# Patient Record
Sex: FEMALE | Race: WHITE | NOT HISPANIC OR LATINO | Employment: FULL TIME | URBAN - NONMETROPOLITAN AREA
[De-identification: names, ages, dates, MRNs, and addresses within clinical notes are randomized per-mention and may not be internally consistent; named-entity substitution may affect disease eponyms.]

---

## 2018-02-02 ENCOUNTER — OFFICE VISIT (OUTPATIENT)
Dept: FAMILY MEDICINE CLINIC | Facility: CLINIC | Age: 33
End: 2018-02-02

## 2018-02-02 VITALS
WEIGHT: 226 LBS | SYSTOLIC BLOOD PRESSURE: 130 MMHG | TEMPERATURE: 98.3 F | RESPIRATION RATE: 18 BRPM | BODY MASS INDEX: 36.32 KG/M2 | DIASTOLIC BLOOD PRESSURE: 88 MMHG | OXYGEN SATURATION: 98 % | HEART RATE: 87 BPM | HEIGHT: 66 IN

## 2018-02-02 DIAGNOSIS — Z71.6 TOBACCO ABUSE COUNSELING: ICD-10-CM

## 2018-02-02 DIAGNOSIS — Z72.0 NICOTINE ABUSE: ICD-10-CM

## 2018-02-02 DIAGNOSIS — E66.09 CLASS 2 OBESITY DUE TO EXCESS CALORIES WITHOUT SERIOUS COMORBIDITY WITH BODY MASS INDEX (BMI) OF 36.0 TO 36.9 IN ADULT: ICD-10-CM

## 2018-02-02 DIAGNOSIS — T14.8XXA MUSCLE STRAIN: Primary | ICD-10-CM

## 2018-02-02 PROBLEM — E66.9 OBESITY (BMI 35.0-39.9 WITHOUT COMORBIDITY): Status: ACTIVE | Noted: 2018-02-02

## 2018-02-02 PROBLEM — K21.9 GASTROESOPHAGEAL REFLUX DISEASE WITHOUT ESOPHAGITIS: Status: ACTIVE | Noted: 2018-02-02

## 2018-02-02 PROCEDURE — 99203 OFFICE O/P NEW LOW 30 MIN: CPT | Performed by: NURSE PRACTITIONER

## 2018-02-02 PROCEDURE — 96372 THER/PROPH/DIAG INJ SC/IM: CPT | Performed by: NURSE PRACTITIONER

## 2018-02-02 RX ORDER — OMEPRAZOLE 20 MG/1
CAPSULE, DELAYED RELEASE ORAL
Refills: 3 | Status: ON HOLD | COMMUNITY
Start: 2018-01-04 | End: 2019-04-16

## 2018-02-02 RX ORDER — GUAIFENESIN AND DEXTROMETHORPHAN HYDROBROMIDE 600; 30 MG/1; MG/1
TABLET, EXTENDED RELEASE ORAL
Refills: 0 | Status: ON HOLD | COMMUNITY
Start: 2018-01-29 | End: 2019-04-16

## 2018-02-02 RX ORDER — DEXAMETHASONE SODIUM PHOSPHATE 10 MG/ML
10 INJECTION INTRAMUSCULAR; INTRAVENOUS ONCE
Status: COMPLETED | OUTPATIENT
Start: 2018-02-02 | End: 2018-02-02

## 2018-02-02 RX ORDER — TIZANIDINE HYDROCHLORIDE 4 MG/1
4 CAPSULE, GELATIN COATED ORAL NIGHTLY
Qty: 30 CAPSULE | Refills: 0 | Status: SHIPPED | OUTPATIENT
Start: 2018-02-02 | End: 2018-03-04

## 2018-02-02 RX ORDER — AMOXICILLIN 875 MG/1
TABLET, COATED ORAL
Refills: 0 | Status: ON HOLD | COMMUNITY
Start: 2018-01-29 | End: 2019-04-16

## 2018-02-02 RX ADMIN — DEXAMETHASONE SODIUM PHOSPHATE 10 MG: 10 INJECTION INTRAMUSCULAR; INTRAVENOUS at 10:03

## 2018-02-02 NOTE — PATIENT INSTRUCTIONS
Shoulder Pain  Many things can cause shoulder pain, including:  · An injury to the area.  · Overuse of the shoulder.  · Arthritis.  The source of the pain can be:  · Inflammation.  · An injury to the shoulder joint.  · An injury to a tendon, ligament, or bone.  Follow these instructions at home:  Take these actions to help with your pain:  · Squeeze a soft ball or a foam pad as much as possible. This helps to keep the shoulder from swelling. It also helps to strengthen the arm.  · Take over-the-counter and prescription medicines only as told by your health care provider.  · If directed, apply ice to the area:  ¨ Put ice in a plastic bag.  ¨ Place a towel between your skin and the bag.  ¨ Leave the ice on for 20 minutes, 2-3 times per day. Stop applying ice if it does not help with the pain.  · If you were given a shoulder sling or immobilizer:  ¨ Wear it as told.  ¨ Remove it to shower or bathe.  ¨ Move your arm as little as possible, but keep your hand moving to prevent swelling.  Contact a health care provider if:  · Your pain gets worse.  · Your pain is not relieved with medicines.  · New pain develops in your arm, hand, or fingers.  Get help right away if:  · Your arm, hand, or fingers:  ¨ Tingle.  ¨ Become numb.  ¨ Become swollen.  ¨ Become painful.  ¨ Turn white or blue.  This information is not intended to replace advice given to you by your health care provider. Make sure you discuss any questions you have with your health care provider.  Document Released: 09/27/2006 Document Revised: 08/13/2017 Document Reviewed: 04/11/2016  [employer] Interactive Patient Education © 2017 [employer]  Tennis Elbow Rehab  Ask your health care provider which exercises are safe for you. Do exercises exactly as told by your health care provider and adjust them as directed. It is normal to feel mild stretching, pulling, tightness, or discomfort as you do these exercises, but you should stop right away if you feel sudden pain or  your pain gets worse. Do not begin these exercises until told by your health care provider.  Stretching and range of motion exercises  These exercises warm up your muscles and joints and improve the movement and flexibility of your elbow. These exercises also help to relieve pain, numbness, and tingling.  Exercise A: Wrist extensor stretch  1. Extend your left / right elbow with your fingers pointing down.  2. Gently pull the palm of your left / right hand toward you until you feel a gentle stretch on the top of your forearm.  3. To increase the stretch, push your left / right hand toward the outer edge or pinkie side of your forearm.  4. Hold this position for __________ seconds.  Repeat __________ times. Complete this exercise __________ times a day.  If directed by your health care provider, repeat this stretch except do it with a bent elbow this time.  Exercise B: Wrist flexor stretch  1. Extend your left / right elbow and turn your palm upward.  2. Gently pull your left / right palm and fingertips back so your wrist extends and your fingers point more toward the ground.  3. You should feel a gentle stretch on the inside of your forearm.  4. Hold this position for __________ seconds.  Repeat __________ times. Complete this exercise __________ times a day.  If directed by your health care provider, repeat this stretch except do it with a bent elbow this time.  Strengthening exercises  These exercises build strength and endurance in your elbow. Endurance is the ability to use your muscles for a long time, even after they get tired.  Exercise C: Wrist extensors  1. Sit with your left / right forearm palm-down and fully supported on a table or countertop. Your elbow should be resting below the height of your shoulder.  2. Let your left / right wrist extend over the edge of the surface.  3. Loosely hold a __________ weight or a piece of rubber exercise band or tubing in your left / right hand. Slowly curl your left /  right hand up toward your forearm. If you are using band or tubing, hold the band or tubing in place with your other hand to provide resistance.  4. Hold this position for __________ seconds.  5. Slowly return to the starting position.  Repeat __________ times. Complete this exercise __________ times a day.  Exercise D: Radial deviators  1. Stand with a __________ weight in your left / right hand. Or, sit while holding a rubber exercise band or tubing with your other arm supported on a table or countertop. Position your hand so your thumb is on top.  2. Raise your hand upward in front of you so your thumb travels toward your forearm, or pull up on the rubber tubing.  3. Hold this position for __________ seconds.  4. Slowly return to the starting position.  Repeat __________ times. Complete this exercise __________ times a day.  Exercise E: Eccentric wrist extensors  1. Sit with your left / right forearm palm-down and fully supported on a table or countertop. Your elbow should be resting below the height of your shoulder.  2. If told by your health care provider, hold a __________ weight in your hand.  3. Let your left / right wrist extend over the edge of the surface.  4. Use your other hand to lift up your left / right hand toward your forearm. Keep your forearm on the table.  5. Using only the muscles in your left / right hand, slowly lower your hand back down to the starting position.  Repeat __________ times. Complete this exercise __________ times a day.  This information is not intended to replace advice given to you by your health care provider. Make sure you discuss any questions you have with your health care provider.  Document Released: 12/18/2006 Document Revised: 08/23/2017 Document Reviewed: 09/15/2016  [employer] Interactive Patient Education © 2017 [employer]  Shoulder Exercises  Ask your health care provider which exercises are safe for you. Do exercises exactly as told by your health care provider  and adjust them as directed. It is normal to feel mild stretching, pulling, tightness, or discomfort as you do these exercises, but you should stop right away if you feel sudden pain or your pain gets worse. Do not begin these exercises until told by your health care provider.  RANGE OF MOTION EXERCISES   These exercises warm up your muscles and joints and improve the movement and flexibility of your shoulder. These exercises also help to relieve pain, numbness, and tingling. These exercises involve stretching your injured shoulder directly.  Exercise A: Pendulum   1. Stand near a wall or a surface that you can hold onto for balance.  2. Bend at the waist and let your left / right arm hang straight down. Use your other arm to support you. Keep your back straight and do not lock your knees.  3. Relax your left / right arm and shoulder muscles, and move your hips and your trunk so your left / right arm swings freely. Your arm should swing because of the motion of your body, not because you are using your arm or shoulder muscles.  4. Keep moving your body so your arm swings in the following directions, as told by your health care provider:  ¨ Side to side.  ¨ Forward and backward.  ¨ In clockwise and counterclockwise circles.  5. Continue each motion for __________ seconds, or for as long as told by your health care provider.  6. Slowly return to the starting position.  Repeat __________ times. Complete this exercise __________ times a day.  Exercise B: Flexion, Standing   1. Stand and hold a broomstick, a cane, or a similar object. Place your hands a little more than shoulder-width apart on the object. Your left / right hand should be palm-up, and your other hand should be palm-down.  2. Keep your elbow straight and keep your shoulder muscles relaxed. Push the stick down with your healthy arm to raise your left / right arm in front of your body, and then over your head until you feel a stretch in your  shoulder.  ¨ Avoid shrugging your shoulder while you raise your arm. Keep your shoulder blade tucked down toward the middle of your back.  3. Hold for __________ seconds.  4. Slowly return to the starting position.  Repeat __________ times. Complete this exercise __________ times a day.  Exercise C: Abduction, Standing  1. Stand and hold a broomstick, a cane, or a similar object. Place your hands a little more than shoulder-width apart on the object. Your left / right hand should be palm-up, and your other hand should be palm-down.  2. While keeping your elbow straight and your shoulder muscles relaxed, push the stick across your body toward your left / right side. Raise your left / right arm to the side of your body and then over your head until you feel a stretch in your shoulder.  ¨ Do not raise your arm above shoulder height, unless your health care provider tells you to do that.  ¨ Avoid shrugging your shoulder while you raise your arm. Keep your shoulder blade tucked down toward the middle of your back.  3. Hold for __________ seconds.  4. Slowly return to the starting position.  Repeat __________ times. Complete this exercise __________ times a day.  Exercise D: Internal Rotation   1. Place your left / right hand behind your back, palm-up.  2. Use your other hand to dangle an exercise band, a towel, or a similar object over your shoulder. Grasp the band with your left / right hand so you are holding onto both ends.  3. Gently pull up on the band until you feel a stretch in the front of your left / right shoulder.  ¨ Avoid shrugging your shoulder while you raise your arm. Keep your shoulder blade tucked down toward the middle of your back.  4. Hold for __________ seconds.  5. Release the stretch by letting go of the band and lowering your hands.  Repeat __________ times. Complete this exercise __________ times a day.  STRETCHING EXERCISES   These exercises warm up your muscles and joints and improve the  movement and flexibility of your shoulder. These exercises also help to relieve pain, numbness, and tingling. These exercises are done using your healthy shoulder to help stretch the muscles of your injured shoulder.  Exercise E: Corner Stretch (External Rotation and Abduction)   1.  a doorway with one of your feet slightly in front of the other. This is called a staggered stance. If you cannot reach your forearms to the door frame, stand facing a corner of a room.  2. Choose one of the following positions as told by your health care provider:  ¨ Place your hands and forearms on the door frame above your head.  ¨ Place your hands and forearms on the door frame at the height of your head.  ¨ Place your hands on the door frame at the height of your elbows.  3. Slowly move your weight onto your front foot until you feel a stretch across your chest and in the front of your shoulders. Keep your head and chest upright and keep your abdominal muscles tight.  4. Hold for __________ seconds.  5. To release the stretch, shift your weight to your back foot.  Repeat __________ times. Complete this stretch __________ times a day.  Exercise F: Extension, Standing  1. Stand and hold a broomstick, a cane, or a similar object behind your back.  ¨ Your hands should be a little wider than shoulder-width apart.  ¨ Your palms should face away from your back.  2. Keeping your elbows straight and keeping your shoulder muscles relaxed, move the stick away from your body until you feel a stretch in your shoulder.  ¨ Avoid shrugging your shoulders while you move the stick. Keep your shoulder blade tucked down toward the middle of your back.  3. Hold for __________ seconds.  4. Slowly return to the starting position.  Repeat __________ times. Complete this exercise __________ times a day.  STRENGTHENING EXERCISES   These exercises build strength and endurance in your shoulder. Endurance is the ability to use your muscles for a long  time, even after they get tired.  Exercise G: External Rotation   1. Sit in a stable chair without armrests.  2. Secure an exercise band at elbow height on your left / right side.  3. Place a soft object, such as a folded towel or a small pillow, between your left / right upper arm and your body to move your elbow a few inches away (about 10 cm) from your side.  4. Hold the end of the band so it is tight and there is no slack.  5. Keeping your elbow pressed against the soft object, move your left / right forearm out, away from your abdomen. Keep your body steady so only your forearm moves.  6. Hold for __________ seconds.  7. Slowly return to the starting position.  Repeat __________ times. Complete this exercise __________ times a day.  Exercise H: Shoulder Abduction   1. Sit in a stable chair without armrests, or stand.  2. Hold a __________ weight in your left / right hand, or hold an exercise band with both hands.  3. Start with your arms straight down and your left / right palm facing in, toward your body.  4. Slowly lift your left / right hand out to your side. Do not lift your hand above shoulder height unless your health care provider tells you that this is safe.  ¨ Keep your arms straight.  ¨ Avoid shrugging your shoulder while you do this movement. Keep your shoulder blade tucked down toward the middle of your back.  5. Hold for __________ seconds.  6. Slowly lower your arm, and return to the starting position.  Repeat __________ times. Complete this exercise __________ times a day.  Exercise I: Shoulder Extension  1. Sit in a stable chair without armrests, or stand.  2. Secure an exercise band to a stable object in front of you where it is at shoulder height.  3. Hold one end of the exercise band in each hand. Your palms should face each other.  4. Straighten your elbows and lift your hands up to shoulder height.  5. Step back, away from the secured end of the exercise band, until the band is tight and  "there is no slack.  6. Squeeze your shoulder blades together as you pull your hands down to the sides of your thighs. Stop when your hands are straight down by your sides. Do not let your hands go behind your body.  7. Hold for __________ seconds.  8. Slowly return to the starting position.  Repeat __________ times. Complete this exercise __________ times a day.  Exercise J: Standing Shoulder Row  1. Sit in a stable chair without armrests, or stand.  2. Secure an exercise band to a stable object in front of you so it is at waist height.  3. Hold one end of the exercise band in each hand. Your palms should be in a thumbs-up position.  4. Bend each of your elbows to an \"L\" shape (about 90 degrees) and keep your upper arms at your sides.  5. Step back until the band is tight and there is no slack.  6. Slowly pull your elbows back behind you.  7. Hold for __________ seconds.  8. Slowly return to the starting position.  Repeat __________ times. Complete this exercise __________ times a day.  Exercise K: Shoulder Press-Ups   1. Sit in a stable chair that has armrests. Sit upright, with your feet flat on the floor.  2. Put your hands on the armrests so your elbows are bent and your fingers are pointing forward. Your hands should be about even with the sides of your body.  3. Push down on the armrests and use your arms to lift yourself off of the chair. Straighten your elbows and lift yourself up as much as you comfortably can.  ¨ Move your shoulder blades down, and avoid letting your shoulders move up toward your ears.  ¨ Keep your feet on the ground. As you get stronger, your feet should support less of your body weight as you lift yourself up.  4. Hold for __________ seconds.  5. Slowly lower yourself back into the chair.  Repeat __________ times. Complete this exercise __________ times a day.  Exercise L: Wall Push-Ups   1. Stand so you are facing a stable wall. Your feet should be about one arm-length away from the " wall.  2. Lean forward and place your palms on the wall at shoulder height.  3. Keep your feet flat on the floor as you bend your elbows and lean forward toward the wall.  4. Hold for __________ seconds.  5. Straighten your elbows to push yourself back to the starting position.  Repeat __________ times. Complete this exercise __________ times a day.  This information is not intended to replace advice given to you by your health care provider. Make sure you discuss any questions you have with your health care provider.  Document Released: 11/01/2006 Document Revised: 09/11/2017 Document Reviewed: 08/28/2016  [employer] Interactive Patient Education © 2017 [employer]

## 2018-02-02 NOTE — PROGRESS NOTES
Chief Complaint   Patient presents with   • Back Pain     Patient is here today for upper back and left shoulder pain. Patient is not sure what is causing the pain. Symptoms started 7 days ago.      HISTORY/ HPI:  Kalyn Tinsley is a 32 y.o.  female who presents today for an acute complaint of upper back and left scapula pain, onset approximately 7 days ago; describes discomfort as a constant dull ache with intermittent harp sensation that radiates down to elbow; has used Ibuprofen only without relief; coughing and moving arm worsens discomfort; symptoms are worse in the morning and in the evening and lessens throughout the day; denies injury; rates severity of symptoms 4 /10; reports a recent upper respiratory illness that is resolving with use of amoxicillin and mucinex DX; has a medical history that consist of GERD, stable with intermittent use of prilosec; uncontrolled obesity, BMI today 36.5; and nicotine/ tobacco abuse, currently using an electronic cigarette; reports allergy to ceclor; denies use of ETOH or illicit drugs; no additional concerns at this time.    Kalyn Tinsley  has a past medical history of GERD (gastroesophageal reflux disease) and Substance abuse.    Allergies   Allergen Reactions   • Ceclor [Cefaclor]      Hives       Current Outpatient Prescriptions:   •  amoxicillin (AMOXIL) 875 MG tablet, TAKE 1 TABLET TWICE A DAY, Disp: , Rfl: 0  •  guaifenesin-dextromethorphan (MUCINEX DM)  MG tablet sustained-release 12 hour tablet, TAKE 1 TABLET BY MOUTH TWICE A DAY, Disp: , Rfl: 0  •  omeprazole (priLOSEC) 20 MG capsule, TAKE 1 CAPSULE DAILY, Disp: , Rfl: 3  Past Medical History:   Diagnosis Date   • GERD (gastroesophageal reflux disease)    • Substance abuse      Past Surgical History:   Procedure Laterality Date   • ANKLE SURGERY     • EAR TUBES     • HERNIA REPAIR     • INNER EAR SURGERY     • TUBAL ABDOMINAL LIGATION       Social History     Social History   • Marital status:       Spouse name: N/A   • Number of children: N/A   • Years of education: N/A     Social History Main Topics   • Smoking status: Current Some Day Smoker     Years: 15.00     Types: Electronic Cigarette   • Smokeless tobacco: Never Used      Comment: vape   • Alcohol use No   • Drug use: No   • Sexual activity: Yes     Partners: Male     Birth control/ protection: None     Other Topics Concern   • None     Social History Narrative   • None     Family History   Problem Relation Age of Onset   • No Known Problems Mother    • Hyperlipidemia Father    • No Known Problems Sister    • No Known Problems Brother    • No Known Problems Daughter    • No Known Problems Paternal Grandmother    • Heart attack Paternal Grandfather    • No Known Problems Sister      Family history, surgical history, past medical history, Allergies and med's reviewed with patient today and updated in [employer] EMR.     ROS:  Review of Systems   Constitutional: Negative for activity change, appetite change, chills, diaphoresis, fatigue, fever and unexpected weight change.   HENT: Positive for congestion and postnasal drip. Negative for ear discharge, ear pain, hearing loss, rhinorrhea, sinus pain, sinus pressure, sneezing, sore throat, tinnitus, trouble swallowing and voice change.    Eyes: Negative for pain, discharge, redness, itching and visual disturbance.   Respiratory: Positive for cough (intermittent and non-purulent). Negative for chest tightness, shortness of breath and wheezing.    Cardiovascular: Negative for chest pain, palpitations and leg swelling.   Gastrointestinal: Negative for abdominal distention, abdominal pain, blood in stool, constipation, diarrhea, nausea and vomiting.   Endocrine: Negative for cold intolerance, heat intolerance, polydipsia, polyphagia and polyuria.   Genitourinary: Negative for difficulty urinating.   Musculoskeletal: Positive for back pain (left upper back/ left scapula discomfort ). Negative for arthralgias, joint  "swelling, neck pain and neck stiffness.   Skin: Negative for color change, pallor, rash and wound.   Allergic/Immunologic: Positive for environmental allergies.   Neurological: Positive for weakness (left hand). Negative for dizziness, tremors, syncope, light-headedness, numbness and headaches.   Hematological: Negative.    Psychiatric/Behavioral: Negative.    All other systems reviewed and are negative.    OBJECTIVE:  Vitals:    02/02/18 0914 02/02/18 0928   BP: 133/93 130/88   BP Location: Right arm    Patient Position: Sitting    Cuff Size: Large Adult    Pulse: 87    Resp: 18    Temp: 98.3 °F (36.8 °C)    TempSrc: Oral    SpO2: 98%    Weight: 103 kg (226 lb)    Height: 167.6 cm (66\")      Physical Exam   Constitutional: She is oriented to person, place, and time. She appears well-developed and well-nourished. She is cooperative.  Non-toxic appearance. She does not have a sickly appearance. She does not appear ill. No distress.   Weight today 226 LBS; BMI 36.5.   HENT:   Head: Normocephalic.   Right Ear: Hearing normal.   Left Ear: Hearing normal.   Nose: Nose normal.   Mouth/Throat: Uvula is midline and mucous membranes are normal. No oral lesions. No uvula swelling. Posterior oropharyngeal erythema (mild) present. No oropharyngeal exudate, posterior oropharyngeal edema or tonsillar abscesses. Tonsils are 0 on the right. Tonsils are 0 on the left. No tonsillar exudate.   Eyes: Conjunctivae, EOM and lids are normal. Pupils are equal, round, and reactive to light. Right eye exhibits no discharge and no exudate. No foreign body present in the right eye. Left eye exhibits no discharge and no exudate. No foreign body present in the left eye. Right conjunctiva is not injected. Right conjunctiva has no hemorrhage. Left conjunctiva is not injected. Left conjunctiva has no hemorrhage. No scleral icterus. Right eye exhibits no nystagmus. Left eye exhibits no nystagmus.   Neck: Trachea normal and full passive range of " motion without pain. Neck supple. No tracheal tenderness, no spinous process tenderness and no muscular tenderness present. No rigidity. Normal range of motion present. No Brudzinski's sign noted. No thyroid mass and no thyromegaly present.   Cardiovascular: Normal rate, regular rhythm, normal heart sounds and normal pulses.  Exam reveals no gallop and no friction rub.    No murmur heard.  Pulmonary/Chest: Effort normal and breath sounds normal. No respiratory distress. She has no decreased breath sounds. She has no wheezes. She has no rhonchi. She has no rales. She exhibits no tenderness.   Musculoskeletal:   Left shoulder appears normal with no atrpohy; no bruising; no crepitus; tenderness over scapula; ROM:minimally limited; drop arm rotator cuff test: negative; empty can supraspinatus test negative cross body adduction test:negative; external rotation/infraspinatus test : negative; biceps tendon rupture: absent; distal neuromuscular exam negative sensory exam normal; motor exam normal; radial pulse intact.   Lymphadenopathy:     She has no cervical adenopathy.   Neurological: She is alert and oriented to person, place, and time.   Skin: Skin is warm, dry and intact. No rash noted. No cyanosis. Nails show no clubbing.   Psychiatric: She has a normal mood and affect. Her speech is normal and behavior is normal. Thought content normal.   Nursing note and vitals reviewed.    ASSESSMENT/ PLAN:  Kalyn was seen today for back pain.    Diagnoses and all orders for this visit:    Muscle strain  -     dexamethasone (DECADRON) injection 10 mg; Inject 1 mL into the shoulder, thigh, or buttocks 1 (One) Time.  -     TiZANidine (ZANAFLEX) 4 MG capsule; Take 1 capsule by mouth Every Night for 30 days.  -     diclofenac (VOLTAREN) 1 % gel gel; Apply 4 g topically 3 (Three) Times a Day.    Nicotine abuse    Tobacco abuse counseling    Class 2 obesity due to excess calories without serious comorbidity with body mass index (BMI) of  36.0 to 36.9 in adult    Orders Placed Today:   New Medications Ordered This Visit   Medications   • dexamethasone (DECADRON) injection 10 mg   • TiZANidine (ZANAFLEX) 4 MG capsule     Sig: Take 1 capsule by mouth Every Night for 30 days.     Dispense:  30 capsule     Refill:  0   • diclofenac (VOLTAREN) 1 % gel gel     Sig: Apply 4 g topically 3 (Three) Times a Day.     Dispense:  100 g     Refill:  0      Management Plan:     # 1. Muscle strain  Suspect acute muscle strain. Prognosis is usually good.  Poor mechanics, poor core strength are often involved.  Healthy weight discussed as means to improve muscle strains. There is no benefit to bed rest as was previously thought.  Caution with GI bleed with NSAIDS and GC. Discussed stretches/exercise.  ?Benefit to chiropractic care, manipulation. Topical agents Biofreeze, Icy Hot, Bengaye etc. can be of some benefit as well.     RECOMMENDATIONS  · Acetaminophen (Tylenol). Follow package insert. Discussed risks/benefits of acetaminophen.  Do not take more than 2000 mg Tylenol per day. Discussed recent changes by FDA for risks of MI.  Caution advised with combination medications. Watch for excess Tylenol (acetaminophen) and is present in numerous over the counter combination medications.  Also, be aware of ingredients as these can be duplicated in combination medications if taking various types together.  If questions check with pharmacist or call.   · NSAIDS (Ibuprofen/Aleve/Diclofenac/Mobic) follow package insert. NSAIDs work by blocking natural substances that cause inflammation.   Discussed risks benefits of Ibuprofen/Aleve/Diclofenac/Mobic for pain. Reviewed recent FDA changes regarding increased risks for MI. The patient is aware of risks.  GI Prophylaxis discussed in relation to use of steroids and or NSAIDS.  Discussed NSAIDS may rarely increase risk for MI/stroke, which may be greater if heart disease is present, tobacco use or diabetic for example.  Rx may  increase risks of GI bleed, platelet dysfunction that can occur at any time. May increase risks of kidney damage/disease.  Should not use before or after CABG or major surgery without direction. Side effects can include dizziness, drowsiness, HA upset stomach to name a few.  If any severe symptoms develop please call or f/u in clinic.  · Caution advised with combination medications. Watch for excess Tylenol as this is acetaminophen and is present in numerous over the counter combination medications.  Also be aware of ingredients as these can be duplicated in combination medications if taking various types together.  If questions check with pharmacist or call.  · DECADRON, dexamethasone, methylprednisolone- Pt notified of potential pros/risks of steroid treatment including rapid improvement of condition; allergic reaction, psychologic reaction (depression, anxiety & insomnia), hyperglycemia, skin change at injection site (color, dimpling), muscle weakness.  Pt is aware they may refuse treatment.  · I have advised the patient to take Tizanidine as prescribed.  Avoid driving while taking this medication due to risk for sedation.  · Information about stretching and shoulder exercised provided in AVS.     # 2.  Nicotine/ tobacco abuse/ counseling  Patient was counseled on and understood the many dangers of continuing to use tobacco. Despite this, Ms. Tinsley states quitting is not an immediate priority at this time. I reminded the patient that if quitting becomes an increased priority to contact us for help with quitting including pharmacologic & nonpharmacologic options or any additional resources.  3 minutes were spent in this counseling.    # 3.  Obesity (BMI 36.5)  The patient is ill today, we will continue to educate the patient on the topics of diet and exercise with the goal of weight loss and preventative illness with each scheduled appointment.     HEALTH PROMOTION/ PREVENTION  I discussed and encouraged age  appropriate health promotion/ prevention screenings and immunizations specific to this client: Tdap, pneumococcal, and pap smear; the patient declines these recommendations at this time.     Risks/benefits of current and new medications discussed with the patient and or family today.  The patient/family are aware and accept that if there any side effects they should call or return to clinic as soon as possible.  Appropriate F/U discussed for topics addressed today. All questions were answered to the satisfactory state of patient/family.  Should symptoms fail to improve or worsen they agree to call or return to clinic or to go to the ER. Education handouts were offered on any new Rx if requested.  Discussed the importance of following up with any needed screening tests/labs/specialist appointments and any requested follow-up recommended by me today.  Importance of maintaining follow-up discussed and patient accepts that missed appointments can delay diagnosis and potentially lead to worsening of conditions.    An After Visit Summary was printed and given to the patient at discharge.    Follow-up: Return in about 1 month (around 3/2/2018), or if symptoms worsen or fail to improve, for Recheck.    Jarad Freeman, APRN 2/2/2018 9:35 AM  This note was electronically signed.

## 2018-03-02 DIAGNOSIS — T14.8XXA MUSCLE STRAIN: ICD-10-CM

## 2018-03-02 RX ORDER — TIZANIDINE 4 MG/1
TABLET ORAL
Qty: 30 TABLET | Refills: 0 | OUTPATIENT
Start: 2018-03-02

## 2018-03-02 NOTE — TELEPHONE ENCOUNTER
Please advised the patient that if her pain has continued, despite use of current medications, that she needs to be reassessed before any additional medications can be provided.

## 2019-04-14 ENCOUNTER — APPOINTMENT (OUTPATIENT)
Dept: CT IMAGING | Facility: HOSPITAL | Age: 34
End: 2019-04-14

## 2019-04-14 ENCOUNTER — HOSPITAL ENCOUNTER (INPATIENT)
Facility: HOSPITAL | Age: 34
LOS: 2 days | Discharge: HOME | End: 2019-04-17
Attending: EMERGENCY MEDICINE | Admitting: SPECIALIST

## 2019-04-14 ENCOUNTER — APPOINTMENT (OUTPATIENT)
Dept: GENERAL RADIOLOGY | Facility: HOSPITAL | Age: 34
End: 2019-04-14

## 2019-04-14 DIAGNOSIS — K56.609 SBO (SMALL BOWEL OBSTRUCTION) (HCC): Primary | ICD-10-CM

## 2019-04-14 LAB
ALBUMIN SERPL-MCNC: 4.5 G/DL (ref 3.5–5)
ALBUMIN/GLOB SERPL: 1.6 G/DL (ref 1.1–2.5)
ALP SERPL-CCNC: 85 U/L (ref 24–120)
ALT SERPL W P-5'-P-CCNC: 27 U/L (ref 0–54)
AMYLASE SERPL-CCNC: 63 U/L (ref 30–110)
ANION GAP SERPL CALCULATED.3IONS-SCNC: 9 MMOL/L (ref 4–13)
AST SERPL-CCNC: 20 U/L (ref 7–45)
BASOPHILS # BLD AUTO: 0.03 10*3/MM3 (ref 0–0.2)
BASOPHILS NFR BLD AUTO: 0.2 % (ref 0–2)
BILIRUB SERPL-MCNC: 0.7 MG/DL (ref 0.1–1)
BUN BLD-MCNC: 13 MG/DL (ref 5–21)
BUN/CREAT SERPL: 19.1 (ref 7–25)
CALCIUM SPEC-SCNC: 9.6 MG/DL (ref 8.4–10.4)
CHLORIDE SERPL-SCNC: 105 MMOL/L (ref 98–110)
CO2 SERPL-SCNC: 24 MMOL/L (ref 24–31)
CREAT BLD-MCNC: 0.68 MG/DL (ref 0.5–1.4)
DEPRECATED RDW RBC AUTO: 42.5 FL (ref 40–54)
EOSINOPHIL # BLD AUTO: 0.07 10*3/MM3 (ref 0–0.7)
EOSINOPHIL NFR BLD AUTO: 0.4 % (ref 0–4)
ERYTHROCYTE [DISTWIDTH] IN BLOOD BY AUTOMATED COUNT: 13.1 % (ref 12–15)
GFR SERPL CREATININE-BSD FRML MDRD: 99 ML/MIN/1.73
GLOBULIN UR ELPH-MCNC: 2.8 GM/DL
GLUCOSE BLD-MCNC: 134 MG/DL (ref 70–100)
HCG SERPL QL: NEGATIVE
HCT VFR BLD AUTO: 46.1 % (ref 37–47)
HGB BLD-MCNC: 16.3 G/DL (ref 12–16)
IMM GRANULOCYTES # BLD AUTO: 0.07 10*3/MM3 (ref 0–0.05)
IMM GRANULOCYTES NFR BLD AUTO: 0.4 % (ref 0–5)
LIPASE SERPL-CCNC: 49 U/L (ref 23–203)
LYMPHOCYTES # BLD AUTO: 1.17 10*3/MM3 (ref 0.72–4.86)
LYMPHOCYTES NFR BLD AUTO: 7.5 % (ref 15–45)
MCH RBC QN AUTO: 31.3 PG (ref 28–32)
MCHC RBC AUTO-ENTMCNC: 35.4 G/DL (ref 33–36)
MCV RBC AUTO: 88.7 FL (ref 82–98)
MONOCYTES # BLD AUTO: 0.56 10*3/MM3 (ref 0.19–1.3)
MONOCYTES NFR BLD AUTO: 3.6 % (ref 4–12)
NEUTROPHILS # BLD AUTO: 13.74 10*3/MM3 (ref 1.87–8.4)
NEUTROPHILS NFR BLD AUTO: 87.9 % (ref 39–78)
NRBC BLD AUTO-RTO: 0 /100 WBC (ref 0–0)
PLATELET # BLD AUTO: 305 10*3/MM3 (ref 130–400)
PMV BLD AUTO: 10.3 FL (ref 6–12)
POTASSIUM BLD-SCNC: 4 MMOL/L (ref 3.5–5.3)
PROT SERPL-MCNC: 7.3 G/DL (ref 6.3–8.7)
RBC # BLD AUTO: 5.2 10*6/MM3 (ref 4.2–5.4)
SODIUM BLD-SCNC: 138 MMOL/L (ref 135–145)
WBC NRBC COR # BLD: 15.64 10*3/MM3 (ref 4.8–10.8)

## 2019-04-14 PROCEDURE — 25010000002 IOPAMIDOL 61 % SOLUTION: Performed by: EMERGENCY MEDICINE

## 2019-04-14 PROCEDURE — 93005 ELECTROCARDIOGRAM TRACING: CPT

## 2019-04-14 PROCEDURE — 25010000002 ONDANSETRON PER 1 MG: Performed by: EMERGENCY MEDICINE

## 2019-04-14 PROCEDURE — 85025 COMPLETE CBC W/AUTO DIFF WBC: CPT | Performed by: EMERGENCY MEDICINE

## 2019-04-14 PROCEDURE — 80053 COMPREHEN METABOLIC PANEL: CPT | Performed by: EMERGENCY MEDICINE

## 2019-04-14 PROCEDURE — 84703 CHORIONIC GONADOTROPIN ASSAY: CPT | Performed by: EMERGENCY MEDICINE

## 2019-04-14 PROCEDURE — 74177 CT ABD & PELVIS W/CONTRAST: CPT

## 2019-04-14 PROCEDURE — 99284 EMERGENCY DEPT VISIT MOD MDM: CPT

## 2019-04-14 PROCEDURE — 93005 ELECTROCARDIOGRAM TRACING: CPT | Performed by: EMERGENCY MEDICINE

## 2019-04-14 PROCEDURE — 82150 ASSAY OF AMYLASE: CPT | Performed by: EMERGENCY MEDICINE

## 2019-04-14 PROCEDURE — 83690 ASSAY OF LIPASE: CPT | Performed by: EMERGENCY MEDICINE

## 2019-04-14 PROCEDURE — 71045 X-RAY EXAM CHEST 1 VIEW: CPT

## 2019-04-14 PROCEDURE — 93010 ELECTROCARDIOGRAM REPORT: CPT | Performed by: INTERNAL MEDICINE

## 2019-04-14 PROCEDURE — 25010000002 MORPHINE PER 10 MG: Performed by: EMERGENCY MEDICINE

## 2019-04-14 RX ORDER — ONDANSETRON 2 MG/ML
4 INJECTION INTRAMUSCULAR; INTRAVENOUS ONCE
Status: COMPLETED | OUTPATIENT
Start: 2019-04-14 | End: 2019-04-14

## 2019-04-14 RX ORDER — SODIUM CHLORIDE 0.9 % (FLUSH) 0.9 %
10 SYRINGE (ML) INJECTION AS NEEDED
Status: DISCONTINUED | OUTPATIENT
Start: 2019-04-14 | End: 2019-04-17 | Stop reason: HOSPADM

## 2019-04-14 RX ADMIN — ONDANSETRON HYDROCHLORIDE 4 MG: 2 INJECTION, SOLUTION INTRAMUSCULAR; INTRAVENOUS at 22:22

## 2019-04-14 RX ADMIN — SODIUM CHLORIDE 1000 ML: 9 INJECTION, SOLUTION INTRAVENOUS at 22:23

## 2019-04-14 RX ADMIN — IOPAMIDOL 100 ML: 612 INJECTION, SOLUTION INTRAVENOUS at 23:38

## 2019-04-14 RX ADMIN — MORPHINE SULFATE 4 MG: 4 INJECTION INTRAVENOUS at 22:22

## 2019-04-15 PROBLEM — K56.609 SBO (SMALL BOWEL OBSTRUCTION) (HCC): Status: ACTIVE | Noted: 2019-04-15

## 2019-04-15 LAB
BILIRUB UR QL STRIP: NEGATIVE
CLARITY UR: CLEAR
COLOR UR: YELLOW
DEPRECATED RDW RBC AUTO: 44.5 FL (ref 40–54)
ERYTHROCYTE [DISTWIDTH] IN BLOOD BY AUTOMATED COUNT: 13.2 % (ref 12–15)
GLUCOSE UR STRIP-MCNC: NEGATIVE MG/DL
HCT VFR BLD AUTO: 45.9 % (ref 37–47)
HGB BLD-MCNC: 15.6 G/DL (ref 12–16)
HGB UR QL STRIP.AUTO: NEGATIVE
KETONES UR QL STRIP: NEGATIVE
LEUKOCYTE ESTERASE UR QL STRIP.AUTO: NEGATIVE
MCH RBC QN AUTO: 31.2 PG (ref 28–32)
MCHC RBC AUTO-ENTMCNC: 34 G/DL (ref 33–36)
MCV RBC AUTO: 91.8 FL (ref 82–98)
NITRITE UR QL STRIP: NEGATIVE
PH UR STRIP.AUTO: 6 [PH] (ref 5–8)
PLATELET # BLD AUTO: 235 10*3/MM3 (ref 130–400)
PMV BLD AUTO: 10 FL (ref 6–12)
PROT UR QL STRIP: NEGATIVE
RBC # BLD AUTO: 5 10*6/MM3 (ref 4.2–5.4)
SP GR UR STRIP: >1.03 (ref 1–1.03)
UROBILINOGEN UR QL STRIP: ABNORMAL
WBC NRBC COR # BLD: 21.06 10*3/MM3 (ref 4.8–10.8)

## 2019-04-15 PROCEDURE — 25010000002 MORPHINE SULFATE (PF) 2 MG/ML SOLUTION: Performed by: SPECIALIST

## 2019-04-15 PROCEDURE — 25010000002 MORPHINE PER 10 MG: Performed by: EMERGENCY MEDICINE

## 2019-04-15 PROCEDURE — 36415 COLL VENOUS BLD VENIPUNCTURE: CPT | Performed by: SPECIALIST

## 2019-04-15 PROCEDURE — 25010000002 ONDANSETRON PER 1 MG: Performed by: SPECIALIST

## 2019-04-15 PROCEDURE — 85027 COMPLETE CBC AUTOMATED: CPT | Performed by: SPECIALIST

## 2019-04-15 PROCEDURE — 81003 URINALYSIS AUTO W/O SCOPE: CPT | Performed by: EMERGENCY MEDICINE

## 2019-04-15 PROCEDURE — 25010000002 ONDANSETRON PER 1 MG: Performed by: EMERGENCY MEDICINE

## 2019-04-15 RX ORDER — ONDANSETRON 2 MG/ML
4 INJECTION INTRAMUSCULAR; INTRAVENOUS ONCE
Status: COMPLETED | OUTPATIENT
Start: 2019-04-15 | End: 2019-04-15

## 2019-04-15 RX ORDER — SODIUM CHLORIDE, SODIUM LACTATE, POTASSIUM CHLORIDE, CALCIUM CHLORIDE 600; 310; 30; 20 MG/100ML; MG/100ML; MG/100ML; MG/100ML
125 INJECTION, SOLUTION INTRAVENOUS CONTINUOUS
Status: DISCONTINUED | OUTPATIENT
Start: 2019-04-15 | End: 2019-04-17 | Stop reason: HOSPADM

## 2019-04-15 RX ORDER — MORPHINE SULFATE 2 MG/ML
6 INJECTION, SOLUTION INTRAMUSCULAR; INTRAVENOUS EVERY 4 HOURS PRN
Status: DISCONTINUED | OUTPATIENT
Start: 2019-04-15 | End: 2019-04-17 | Stop reason: HOSPADM

## 2019-04-15 RX ORDER — NALOXONE HCL 0.4 MG/ML
0.4 VIAL (ML) INJECTION
Status: DISCONTINUED | OUTPATIENT
Start: 2019-04-15 | End: 2019-04-17 | Stop reason: HOSPADM

## 2019-04-15 RX ORDER — SODIUM CHLORIDE 9 MG/ML
250 INJECTION, SOLUTION INTRAVENOUS CONTINUOUS
Status: DISCONTINUED | OUTPATIENT
Start: 2019-04-15 | End: 2019-04-15

## 2019-04-15 RX ORDER — ONDANSETRON 2 MG/ML
4 INJECTION INTRAMUSCULAR; INTRAVENOUS EVERY 6 HOURS PRN
Status: DISCONTINUED | OUTPATIENT
Start: 2019-04-15 | End: 2019-04-17 | Stop reason: HOSPADM

## 2019-04-15 RX ORDER — MORPHINE SULFATE 2 MG/ML
6 INJECTION, SOLUTION INTRAMUSCULAR; INTRAVENOUS ONCE
Status: COMPLETED | OUTPATIENT
Start: 2019-04-15 | End: 2019-04-15

## 2019-04-15 RX ADMIN — MORPHINE SULFATE 6 MG: 2 INJECTION, SOLUTION INTRAMUSCULAR; INTRAVENOUS at 23:14

## 2019-04-15 RX ADMIN — ONDANSETRON HYDROCHLORIDE 4 MG: 2 INJECTION, SOLUTION INTRAMUSCULAR; INTRAVENOUS at 09:59

## 2019-04-15 RX ADMIN — SODIUM CHLORIDE, POTASSIUM CHLORIDE, SODIUM LACTATE AND CALCIUM CHLORIDE 125 ML/HR: 600; 310; 30; 20 INJECTION, SOLUTION INTRAVENOUS at 18:45

## 2019-04-15 RX ADMIN — ONDANSETRON HYDROCHLORIDE 4 MG: 2 INJECTION, SOLUTION INTRAMUSCULAR; INTRAVENOUS at 01:17

## 2019-04-15 RX ADMIN — ONDANSETRON HYDROCHLORIDE 4 MG: 2 INJECTION, SOLUTION INTRAMUSCULAR; INTRAVENOUS at 00:47

## 2019-04-15 RX ADMIN — SODIUM CHLORIDE 250 ML/HR: 9 INJECTION, SOLUTION INTRAVENOUS at 01:17

## 2019-04-15 RX ADMIN — MORPHINE SULFATE 6 MG: 2 INJECTION, SOLUTION INTRAMUSCULAR; INTRAVENOUS at 09:59

## 2019-04-15 RX ADMIN — MORPHINE SULFATE 6 MG: 2 INJECTION, SOLUTION INTRAMUSCULAR; INTRAVENOUS at 02:49

## 2019-04-15 RX ADMIN — MORPHINE SULFATE 6 MG: 2 INJECTION, SOLUTION INTRAMUSCULAR; INTRAVENOUS at 00:47

## 2019-04-15 RX ADMIN — ONDANSETRON HYDROCHLORIDE 4 MG: 2 INJECTION, SOLUTION INTRAMUSCULAR; INTRAVENOUS at 18:45

## 2019-04-15 RX ADMIN — MORPHINE SULFATE 6 MG: 2 INJECTION, SOLUTION INTRAMUSCULAR; INTRAVENOUS at 06:29

## 2019-04-15 RX ADMIN — MORPHINE SULFATE 6 MG: 2 INJECTION, SOLUTION INTRAMUSCULAR; INTRAVENOUS at 14:04

## 2019-04-15 RX ADMIN — SODIUM CHLORIDE, POTASSIUM CHLORIDE, SODIUM LACTATE AND CALCIUM CHLORIDE 125 ML/HR: 600; 310; 30; 20 INJECTION, SOLUTION INTRAVENOUS at 10:55

## 2019-04-15 RX ADMIN — SODIUM CHLORIDE, POTASSIUM CHLORIDE, SODIUM LACTATE AND CALCIUM CHLORIDE 125 ML/HR: 600; 310; 30; 20 INJECTION, SOLUTION INTRAVENOUS at 02:19

## 2019-04-15 RX ADMIN — MORPHINE SULFATE 6 MG: 2 INJECTION, SOLUTION INTRAMUSCULAR; INTRAVENOUS at 18:45

## 2019-04-15 NOTE — PAYOR COMM NOTE
"[id number]  ADMIT INPT 4-14-19  UR PHONE    [phone]    Kalyn Blandon (33 y.o. Female)     Date of Birth Social Security Number Address Home Phone MRN    [id number]  [address] [facility] [phone] [id number]    [place]ahai Marital Status          Other        Admission Date Admission Type Admitting Provider Attending Provider Department, Room/Bed    4/14/19 Emergency Charlette Hazel MD Tyrrell, Dana R, MD [facility] 3C, 388/1    Discharge Date Discharge Disposition Discharge Destination                       Attending Provider:  Charlette Hazel MD    Allergies:  Ceclor [Cefaclor]    Isolation:  None   Infection:  None   Code Status:  Not on file    Ht:  167.6 cm (65.98\")   Wt:  95 kg (209 lb 7 oz)    Admission Cmt:  None   Principal Problem:  None                Active Insurance as of 4/14/2019     Primary Coverage     Payor Plan Insurance Group Employer/Plan Group    [facility] [employer] [id number]     Payor Plan Address Payor Plan Phone Number Payor Plan Fax Number Effective Dates    [address] [phone]  11/1/2018 - None Entered    [place] [address]       Subscriber Name Subscriber Birth Date Member ID       EDINSON BLANDON 7/9/1975 [id number]                 Emergency Contacts      (Rel.) Home Phone Work Phone Mobile Phone    Edinson Blandon (Spouse) [phone] -- --               History & Physical      Charlette Hazel MD at 4/15/2019  8:43 AM            Charlette Hazel MD  H&P    Patient Care Team:  Dave Lu MD as PCP - General  Dave Lu MD as PCP - Family Medicine    Chief complaint abdominal pain nausea    Subjective     Kalyn Blandon  is a 33 y.o. female presents with severe abdominal pain which began yesterday.  She has alternating constipation and diarrhea issues for long time.  Yesterday B she began having crampy abdominal pain that progressively worsened throughout the day associated " with nausea but no vomiting.  She did have a relatively normal bowel movement yesterday but none since.  No gas.  No blood.  I performed incisional hernia repair with mesh several years ago and she had a tubal ligation that had become infected with significant cellulitis of her pannus prior to that .      Review of Systems   Pertinent items are noted in HPI, all other systems reviewed and negative    History  Past Medical History:   Diagnosis Date   • GERD (gastroesophageal reflux disease)    • Substance abuse (CMS/HCC)      Past Surgical History:   Procedure Laterality Date   • ANKLE SURGERY     • EAR TUBES     • HERNIA REPAIR     • INNER EAR SURGERY     • TUBAL ABDOMINAL LIGATION       Family History   Problem Relation Age of Onset   • No Known Problems Mother    • Hyperlipidemia Father    • No Known Problems Sister    • No Known Problems Brother    • No Known Problems Daughter    • No Known Problems Paternal Grandmother    • Heart attack Paternal Grandfather    • No Known Problems Sister      Social History     Tobacco Use   • Smoking status: Never Smoker   • Smokeless tobacco: Never Used   Substance Use Topics   • Alcohol use: No   • Drug use: No     Medications Prior to Admission   Medication Sig Dispense Refill Last Dose   • amoxicillin (AMOXIL) 875 MG tablet TAKE 1 TABLET TWICE A DAY  0 Taking   • diclofenac (VOLTAREN) 1 % gel gel Apply 4 g topically 3 (Three) Times a Day. 100 g 0    • guaifenesin-dextromethorphan (MUCINEX DM)  MG tablet sustained-release 12 hour tablet TAKE 1 TABLET BY MOUTH TWICE A DAY  0 Taking   • omeprazole (priLOSEC) 20 MG capsule TAKE 1 CAPSULE DAILY  3 Taking     Allergies:  Ceclor [cefaclor]    Objective     Vital Signs  Temp:  [97.5 °F (36.4 °C)-97.8 °F (36.6 °C)] 97.8 °F (36.6 °C)  Heart Rate:  [72-75] 74  Resp:  [16-18] 18  BP: (147-174)/(90-99) 147/99    Physical Exam:      General Appearance:    Alert, cooperative, in no acute distress   Head:    Normocephalic, without  obvious abnormality, atraumatic   Eyes:            Lids and lashes normal, conjunctivae and sclerae normal, no   icterus, no pallor, corneas clear, PERRLA   Ears:    Ears appear intact with no abnormalities noted   Neck:   No adenopathy, supple, trachea midline   Back:     No kyphosis present, no scoliosis present, no skin lesions,      erythema or scars, no tenderness to percussion or                   palpation,   range of motion normal   Lungs:     Clear to auscultation,respirations regular, even and                  unlabored    Heart:    Regular rhythm and normal rate, normal S1 and S2, no            murmur, no gallop, no rub, no click   Chest Wall:    No abnormalities observed   Abdomen:    Softly distended somewhat tympanitic mild diffuse tenderness but no peritoneal signs.  Incision without hernia   Rectal:     Deferred   Extremities:   Moves all extremities well, no edema, no cyanosis, no             redness   Pulses:   Pulses palpable and equal bilaterally   Skin:   No bleeding, bruising or rash   Lymph nodes:   No palpable adenopathy   Neurologic:   No focal deficits       Results Review:      Lab Results (last 72 hours)     Procedure Component Value Units Date/Time    CBC (No Diff) [[id number]]  (Abnormal) Collected:  04/15/19 0431    Specimen:  Blood Updated:  04/15/19 0439     WBC 21.06 10*3/mm3      RBC 5.00 10*6/mm3      Hemoglobin 15.6 g/dL      Hematocrit 45.9 %      MCV 91.8 fL      MCH 31.2 pg      MCHC 34.0 g/dL      RDW 13.2 %      RDW-SD 44.5 fl      MPV 10.0 fL      Platelets 235 10*3/mm3     Urinalysis With Culture If Indicated - Urine, Clean Catch [[id number]]  (Abnormal) Collected:  04/15/19 0033    Specimen:  Urine, Clean Catch Updated:  04/15/19 0041     Color, UA Yellow     Appearance, UA Clear     pH, UA 6.0     Specific Gravity, UA >1.030     Glucose, UA Negative     Ketones, UA Negative     Bilirubin, UA Negative     Blood, UA Negative     Protein, UA Negative     Leuk Esterase, UA  Negative     Nitrite, UA Negative     Urobilinogen, UA 0.2 E.U./dL    Narrative:       Urine microscopic not indicated.    Comprehensive Metabolic Panel [[id number]]  (Abnormal) Collected:  04/14/19 2226    Specimen:  Blood Updated:  04/14/19 2245     Glucose 134 mg/dL      BUN 13 mg/dL      Creatinine 0.68 mg/dL      Sodium 138 mmol/L      Potassium 4.0 mmol/L      Chloride 105 mmol/L      CO2 24.0 mmol/L      Calcium 9.6 mg/dL      Total Protein 7.3 g/dL      Albumin 4.50 g/dL      ALT (SGPT) 27 U/L      AST (SGOT) 20 U/L      Alkaline Phosphatase 85 U/L      Total Bilirubin 0.7 mg/dL      eGFR Non African Amer 99 mL/min/1.73      Globulin 2.8 gm/dL      A/G Ratio 1.6 g/dL      BUN/Creatinine Ratio 19.1     Anion Gap 9.0 mmol/L     Narrative:       GFR Normal >60  Chronic Kidney Disease <60  Kidney Failure <15    Lipase [[id number]]  (Normal) Collected:  04/14/19 2226    Specimen:  Blood Updated:  04/14/19 2245     Lipase 49 U/L     Amylase [[id number]]  (Normal) Collected:  04/14/19 2226    Specimen:  Blood Updated:  04/14/19 2245     Amylase 63 U/L     hCG, Serum, Qualitative [[id number]]  (Normal) Collected:  04/14/19 2226    Specimen:  Blood Updated:  04/14/19 2243     HCG Qualitative Negative    CBC & Differential [[id number]] Collected:  04/14/19 2226    Specimen:  Blood Updated:  04/14/19 2234    Narrative:       The following orders were created for panel order CBC & Differential.  Procedure                               Abnormality         Status                     ---------                               -----------         ------                     CBC Auto Differential[[id number]]        Abnormal            Final result                 Please view results for these tests on the individual orders.    CBC Auto Differential [[id number]]  (Abnormal) Collected:  04/14/19 2226    Specimen:  Blood Updated:  04/14/19 2234     WBC 15.64 10*3/mm3      RBC 5.20 10*6/mm3      Hemoglobin 16.3 g/dL      Hematocrit 46.1 %       MCV 88.7 fL      MCH 31.3 pg      MCHC 35.4 g/dL      RDW 13.1 %      RDW-SD 42.5 fl      MPV 10.3 fL      Platelets 305 10*3/mm3      Neutrophil % 87.9 %      Lymphocyte % 7.5 %      Monocyte % 3.6 %      Eosinophil % 0.4 %      Basophil % 0.2 %      Immature Grans % 0.4 %      Neutrophils, Absolute 13.74 10*3/mm3      Lymphocytes, Absolute 1.17 10*3/mm3      Monocytes, Absolute 0.56 10*3/mm3      Eosinophils, Absolute 0.07 10*3/mm3      Basophils, Absolute 0.03 10*3/mm3      Immature Grans, Absolute 0.07 10*3/mm3      nRBC 0.0 /100 WBC         Imaging Results (last 72 hours)     Procedure Component Value Units Date/Time    CT Abdomen Pelvis With Contrast [[id number]] Collected:  04/15/19 0759     Updated:  04/15/19 0812    Narrative:          History:  33-year-old with right upper quadrant abdominal pain. Probably biliary  colic.     Reference:  CT abdomen pelvis April 2016     Technique  Contrast-enhanced CT abdomen/pelvis was performed with coronal and  sagittal reformatted images provided.     For this CT exam, one or more of the following dose reduction techniques  was employed:  -automated exposure control  -mA and/or kVp adjustment for patient size  -iterative reconstruction      mGy-cm     Findings     Chest  Incidental scanning through the lower chest demonstrates clear lung  bases.     Abdomen  Liver and spleen unremarkable. Gallbladder is unremarkable. Pancreas and  adrenal glands are unremarkable.     2 small left renal cysts measuring 11 mm each. Kidneys otherwise normal  without obstructive uropathy. Normal caliber abdominal aorta.     No free air, free fluid, or lymphadenopathy.     There are dilated proximal/mid small bowel segments. Distal ileum in the  right lower quadrant is decompressed. Transition point appears to be a  small bowel segment adhered to the ventral mesh from previous hernia  repair. This may be serving as the point of obstruction. There is mild  edema adjacent to the  dilated small bowel segments which can be measured  up to 3.2 cm diameter.     Much of the colon is contracted limiting its evaluation.     Pelvis  Nondistended urinary bladder. Uterus is unremarkable. There is a small  left ovarian follicle measuring around 2.3 cm. There is a small amount  of free fluid in the pelvis. Normal appendix.     No fractures or bone lesions.          Impression:       High-grade small bowel obstruction secondary to a segment of small bowel  that is adhered/tethered to the infraumbilical ventral mesh material.  Small amount of free fluid in the pelvis.     A preliminary report was provided by [facility]. No discrepancy.  This report was finalized on 04/15/2019 08:08 by Dr Jay Jay Begum, .    XR Chest 1 View [[id number]] Collected:  04/15/19 0643     Updated:  04/15/19 0647    Narrative:       EXAMINATION:   XR CHEST 1 VW-  4/15/2019 6:43 AM CDT     HISTORY: Right upper quadrant pain     Frontal upright radiograph of the chest 4/14/2019 10:50 PM CDT     COMPARISON: None.     FINDINGS:   The lungs are clear. The cardiomediastinal silhouette and pulmonary  vascularity are within normal limits.      The osseous structures and surrounding soft tissues demonstrate no acute  abnormality.       Impression:       1. No radiographic evidence of acute cardiopulmonary process.        This report was finalized on 04/15/2019 06:44 by Dr. Maxim Kelly MD.          Assessment/Plan       SBO (small bowel obstruction) (CMS/HCC)      NG tube, hydration, observation.  Operative versus nonoperative management of bowel obstructions as well as the possibility of small bowel follow-through will be necessary all were discussed      Charlette Hazel MD  04/15/19  8:43 AM          Electronically signed by Charlette Hazel MD at 4/15/2019  8:47 AM          Emergency Department Notes      Zoltan Hummel MD at 4/14/2019  9:54 PM          Subjective   This is a 33-year-old female who presents to the emergency  department for evaluation of abdominal pain with associated nausea.  Patient reports onset around 10 AM this morning after eating a bowl of cereal.  It has been worsening throughout the day.  No specific aggravating or relieving factors.  She has not been vomiting.  No flank or back pain.  No lower abdominal or pelvic discomfort.  No dysuria or hematuria.  No diarrhea, constipation, or blood in the stool.  No fever or shaking chills.  She does not feel short of breath.  No cough or wheezing.  No chest pain, heart racing, or dizziness.  She has not taken any medications for relief of her discomfort but presents as they have not yet resolved.  No history of previous similar.  Review of systems otherwise negative.  She has no additional complaints.            Review of Systems   All other systems reviewed and are negative.      Past Medical History:   Diagnosis Date   • GERD (gastroesophageal reflux disease)    • Substance abuse (CMS/[facility])        Allergies   Allergen Reactions   • Ceclor [Cefaclor]      Hives       Past Surgical History:   Procedure Laterality Date   • ANKLE SURGERY     • EAR TUBES     • HERNIA REPAIR     • INNER EAR SURGERY     • TUBAL ABDOMINAL LIGATION         Family History   Problem Relation Age of Onset   • No Known Problems Mother    • Hyperlipidemia Father    • No Known Problems Sister    • No Known Problems Brother    • No Known Problems Daughter    • No Known Problems Paternal Grandmother    • Heart attack Paternal Grandfather    • No Known Problems Sister        Social History     Socioeconomic History   • Marital status:      Spouse name: Not on file   • Number of children: Not on file   • Years of education: Not on file   • Highest education level: Not on file   Tobacco Use   • Smoking status: Current Some Day Smoker     Years: 15.00     Types: Electronic Cigarette   • Smokeless tobacco: Never Used   • Tobacco comment: vape   Substance and Sexual Activity   • Alcohol use: No   • Drug  use: No   • Sexual activity: Defer     Partners: Male     Birth control/protection: None           Objective   Physical Exam   Constitutional: She appears well-developed and well-nourished.   Eyes: EOM are normal. Pupils are equal, round, and reactive to light.   Neck: Normal range of motion. Neck supple. No JVD present. No tracheal deviation present.   Cardiovascular: Normal rate, regular rhythm and normal heart sounds.   Pulmonary/Chest: Effort normal and breath sounds normal. No respiratory distress. She has no wheezes. She exhibits no tenderness.   Abdominal: Soft. Bowel sounds are normal. She exhibits no distension. There is tenderness in the right upper quadrant, epigastric area and left upper quadrant. There is no rigidity, no rebound, no guarding, no CVA tenderness, no tenderness at McBurney's point and negative Hillman's sign.   Musculoskeletal: She exhibits no edema or deformity.   Neurological: She is alert.   Skin: Skin is warm and dry. Capillary refill takes less than 2 seconds.   Psychiatric: She has a normal mood and affect. Her behavior is normal.   Nursing note and vitals reviewed.      Procedures          ED Course      Patient endorsed to me waiting on CT:    CT shows SBO with transition along the anterior abdominal wall likely from adhesions. The more distal small bowel is completely decompressed suggesting a high grade obstruction.     She has continued pain to her right mid and upper abdominal pain, no guarding, no rigidity. She does share she has not passed gas in 24 hours.     Dr. Salvador accepts,     NG tube being placed.       Family, social and past history reviewed as below, prior documentation of H and Ps and other documentation are reviewed:    Past Medical History:   Diagnosis Date   • GERD (gastroesophageal reflux disease)    • Substance abuse (CMS/HCC)        Past Surgical History:   Procedure Laterality Date   • ANKLE SURGERY     • EAR TUBES     • HERNIA REPAIR     • INNER EAR SURGERY  "    • TUBAL ABDOMINAL LIGATION         Social History     Socioeconomic History   • Marital status:      Spouse name: Not on file   • Number of children: Not on file   • Years of education: Not on file   • Highest education level: Not on file   Tobacco Use   • Smoking status: Current Some Day Smoker     Years: 15.00     Types: Electronic Cigarette   • Smokeless tobacco: Never Used   • Tobacco comment: vape   Substance and Sexual Activity   • Alcohol use: No   • Drug use: No   • Sexual activity: Defer     Partners: Male     Birth control/protection: None       Family history: reviewed and      ED Course as of Apr 15 0058   Sun Apr 14, 2019   2313 WBC: (!) 15.64 [MW]      ED Course User Index  [MW] Alberto Peterson, DO      IV fluids, morphine, and Zofran ordered while we await labs and imaging    EKG at 2117 shows a sinus rhythm with a rate of 64 bpm.  Intervals within normal limits.  Normal axis.  R wave progression is maintained.  No T-wave inversion.  No ST segment depression/elevation or evidence for acute ischemia/infarction.    Labs reviewed    X-ray of the chest as interpreted by myself shows no acute cardiopulmonary process    CT scan, reassessment, and disposition are pending at shift change            MDM  Patient with significant right upper quadrant pain  She is afebrile  Negative Hillman sign  Presumed gallbladder source  Mild leukocytosis  Benign x-ray of the chest    CT scan, reassessment, and disposition are pending at shift change      Final diagnoses:   SBO (small bowel obstruction) (CMS/[facility])            Zoltan Hummel MD  04/15/19 0437      Electronically signed by Zoltan Hummel MD at 4/15/2019  4:37 AM       ICU Vital Signs     Row Name 04/15/19 1113 04/15/19 0800 04/15/19 0741 04/15/19 0605 04/15/19 0547       Height and Weight    Height  --  --  --  167.6 cm (65.98\")  --    Weight  --  --  --  95 kg (209 lb 7 oz)  --    Ideal Body Weight (IBW) (kg)  --  --  --  59.54  --    " "BSA (Calculated - sq m)  --  --  --  2.04 sq meters  --    BMI (Calculated)  --  --  --  33.8  --    Weight in (lb) to have BMI = 25  --  --  --  154.5  --       Vitals    Temp  98 °F (36.7 °C)  --  97.8 °F (36.6 °C)  --  --    Temp src  Oral  --  Oral  --  --    Pulse  75  --  74  --  --    Heart Rate Source  Monitor  --  Monitor  --  --    Resp  16  --  18  --  --    Resp Rate Source  Visual  --  Visual  --  --    BP  139/94  --  147/99  --  153/94 Kaley REILLY notified    BP Location  Left arm  --  Left arm  --  Left arm    BP Method  Automatic  --  Automatic  --  Automatic    Patient Position  Lying  --  Lying  --  Lying       Oxygen Therapy    SpO2  97 %  --  98 %  --  --    Device (Oxygen Therapy)  --  room air  room air  --  --    Row Name 04/15/19 0203 04/15/19 0146 04/14/19 2107             Height and Weight    Height  167.6 cm (65.98\")  --  167.6 cm (66\")      Height Method  Stated  --  --      Weight  95 kg (209 lb 6.4 oz)  --  94.3 kg (208 lb)      Weight Method  Bed scale  --  --      Ideal Body Weight (IBW) (kg)  59.54  --  59.58      BSA (Calculated - sq m)  2.04 sq meters  --  2.03 sq meters      BMI (Calculated)  33.8  --  33.6      Weight in (lb) to have BMI = 25  154.5  --  154.6         Vitals    Temp  97.6 °F (36.4 °C)  97.6 °F (36.4 °C)  97.5 °F (36.4 °C)      Temp src  Oral  --  --      Pulse  72  75  74      Heart Rate Source  Monitor  --  --      Resp  18  16  18      Resp Rate Source  Visual  --  --      BP  174/95 Kaley REILLY notified  155/90  156/98      BP Location  Left arm  --  --      BP Method  Automatic  --  --      Patient Position  Lying  --  --         Oxygen Therapy    SpO2  100 %  99 %  99 %      Pulse Oximetry Type  Intermittent  --  --      Device (Oxygen Therapy)  room air  --  --          Intake & Output (last 3 days)       04/12 0701 - 04/13 0700 04/13 0701 - 04/14 0700 04/14 0701 - 04/15 0700 04/15 0701 - 04/16 0700    I.V. (mL/kg)    975 (10.3)    IV Piggyback   1000     " "Total Intake(mL/kg)   1000 (10.5) 975 (10.3)    Urine (mL/kg/hr)   350     Emesis/NG output   600     Total Output   950     Net   +50 +975                Lines, Drains & Airways    Active LDAs     Name:   Placement date:   Placement time:   Site:   Days:    NG/OG Tube Nasogastric 18 Fr Left nostril   04/15/19    0120    Left nostril   less than 1         Inactive LDAs     None                Hospital Medications (all)       Dose Frequency Start End    iopamidol (ISOVUE-300) 61 % injection 100 mL 100 mL Once in Imaging 4/14/2019 4/14/2019    Sig - Route: Infuse 100 mL into a venous catheter Once. - Intravenous    lactated ringers infusion 125 mL/hr Continuous 4/15/2019     Sig - Route: Infuse 125 mL/hr into a venous catheter Continuous. - Intravenous    morphine injection 4 mg 4 mg Once 4/14/2019 4/14/2019    Sig - Route: Infuse 1 mL into a venous catheter 1 (One) Time. - Intravenous    morphine injection 6 mg 6 mg Once 4/15/2019 4/15/2019    Sig - Route: Infuse 3 mL into a venous catheter 1 (One) Time. - Intravenous    morphine injection 6 mg 6 mg Every 4 Hours PRN 4/15/2019 4/25/2019    Sig - Route: Infuse 3 mL into a venous catheter Every 4 (Four) Hours As Needed for Severe Pain . - Intravenous    Linked Group 1:  \"And\" Linked Group Details        naloxone (NARCAN) injection 0.4 mg 0.4 mg Every 5 Minutes PRN 4/15/2019     Sig - Route: Infuse 1 mL into a venous catheter Every 5 (Five) Minutes As Needed for Respiratory Depression. - Intravenous    Linked Group 1:  \"And\" Linked Group Details        ondansetron (ZOFRAN) injection 4 mg 4 mg Once 4/14/2019 4/14/2019    Sig - Route: Infuse 2 mL into a venous catheter 1 (One) Time. - Intravenous    ondansetron (ZOFRAN) injection 4 mg 4 mg Once 4/15/2019 4/15/2019    Sig - Route: Infuse 2 mL into a venous catheter 1 (One) Time. - Intravenous    ondansetron (ZOFRAN) injection 4 mg 4 mg Every 6 Hours PRN 4/15/2019     Sig - Route: Infuse 2 mL into a venous catheter Every 6 " "(Six) Hours As Needed for Nausea or Vomiting. - Intravenous    ondansetron (ZOFRAN) injection 4 mg 4 mg Once 4/15/2019 4/15/2019    Sig - Route: Infuse 2 mL into a venous catheter 1 (One) Time. - Intravenous    sodium chloride 0.9 % bolus 1,000 mL 1,000 mL Once 4/14/2019 4/15/2019    Sig - Route: Infuse 1,000 mL into a venous catheter 1 (One) Time. - Intravenous    sodium chloride 0.9 % flush 10 mL 10 mL As Needed 4/14/2019     Sig - Route: Infuse 10 mL into a venous catheter As Needed for Line Care. - Intravenous    Linked Group 2:  \"And\" Linked Group Details        sodium chloride 0.9 % infusion (Discontinued) 250 mL/hr Continuous 4/15/2019 4/15/2019    Sig - Route: Infuse 250 mL/hr into a venous catheter Continuous. - Intravenous          Blood Administration Record (From admission, onward)    None        Lab Results (last 72 hours)     Procedure Component Value Units Date/Time    CBC (No Diff) [[id number]]  (Abnormal) Collected:  04/15/19 0431    Specimen:  Blood Updated:  04/15/19 0439     WBC 21.06 10*3/mm3      RBC 5.00 10*6/mm3      Hemoglobin 15.6 g/dL      Hematocrit 45.9 %      MCV 91.8 fL      MCH 31.2 pg      MCHC 34.0 g/dL      RDW 13.2 %      RDW-SD 44.5 fl      MPV 10.0 fL      Platelets 235 10*3/mm3     Urinalysis With Culture If Indicated - Urine, Clean Catch [[id number]]  (Abnormal) Collected:  04/15/19 0033    Specimen:  Urine, Clean Catch Updated:  04/15/19 0041     Color, UA Yellow     Appearance, UA Clear     pH, UA 6.0     Specific Gravity, UA >1.030     Glucose, UA Negative     Ketones, UA Negative     Bilirubin, UA Negative     Blood, UA Negative     Protein, UA Negative     Leuk Esterase, UA Negative     Nitrite, UA Negative     Urobilinogen, UA 0.2 E.U./dL    Narrative:       Urine microscopic not indicated.    Comprehensive Metabolic Panel [[id number]]  (Abnormal) Collected:  04/14/19 2226    Specimen:  Blood Updated:  04/14/19 2245     Glucose 134 mg/dL      BUN 13 mg/dL      Creatinine " 0.68 mg/dL      Sodium 138 mmol/L      Potassium 4.0 mmol/L      Chloride 105 mmol/L      CO2 24.0 mmol/L      Calcium 9.6 mg/dL      Total Protein 7.3 g/dL      Albumin 4.50 g/dL      ALT (SGPT) 27 U/L      AST (SGOT) 20 U/L      Alkaline Phosphatase 85 U/L      Total Bilirubin 0.7 mg/dL      eGFR Non African Amer 99 mL/min/1.73      Globulin 2.8 gm/dL      A/G Ratio 1.6 g/dL      BUN/Creatinine Ratio 19.1     Anion Gap 9.0 mmol/L     Narrative:       GFR Normal >60  Chronic Kidney Disease <60  Kidney Failure <15    Lipase [[id number]]  (Normal) Collected:  04/14/19 2226    Specimen:  Blood Updated:  04/14/19 2245     Lipase 49 U/L     Amylase [[id number]]  (Normal) Collected:  04/14/19 2226    Specimen:  Blood Updated:  04/14/19 2245     Amylase 63 U/L     hCG, Serum, Qualitative [[id number]]  (Normal) Collected:  04/14/19 2226    Specimen:  Blood Updated:  04/14/19 2243     HCG Qualitative Negative    CBC & Differential [[id number]] Collected:  04/14/19 2226    Specimen:  Blood Updated:  04/14/19 2234    Narrative:       The following orders were created for panel order CBC & Differential.  Procedure                               Abnormality         Status                     ---------                               -----------         ------                     CBC Auto Differential[[id number]]        Abnormal            Final result                 Please view results for these tests on the individual orders.    CBC Auto Differential [[id number]]  (Abnormal) Collected:  04/14/19 2226    Specimen:  Blood Updated:  04/14/19 2234     WBC 15.64 10*3/mm3      RBC 5.20 10*6/mm3      Hemoglobin 16.3 g/dL      Hematocrit 46.1 %      MCV 88.7 fL      MCH 31.3 pg      MCHC 35.4 g/dL      RDW 13.1 %      RDW-SD 42.5 fl      MPV 10.3 fL      Platelets 305 10*3/mm3      Neutrophil % 87.9 %      Lymphocyte % 7.5 %      Monocyte % 3.6 %      Eosinophil % 0.4 %      Basophil % 0.2 %      Immature Grans % 0.4 %      Neutrophils,  Absolute 13.74 10*3/mm3      Lymphocytes, Absolute 1.17 10*3/mm3      Monocytes, Absolute 0.56 10*3/mm3      Eosinophils, Absolute 0.07 10*3/mm3      Basophils, Absolute 0.03 10*3/mm3      Immature Grans, Absolute 0.07 10*3/mm3      nRBC 0.0 /100 WBC           Imaging Results (last 72 hours)     Procedure Component Value Units Date/Time    CT Abdomen Pelvis With Contrast [[id number]] Collected:  04/15/19 0759     Updated:  04/15/19 0812    Narrative:          History:  33-year-old with right upper quadrant abdominal pain. Probably biliary  colic.     Reference:  CT abdomen pelvis April 2016     Technique  Contrast-enhanced CT abdomen/pelvis was performed with coronal and  sagittal reformatted images provided.     For this CT exam, one or more of the following dose reduction techniques  was employed:  -automated exposure control  -mA and/or kVp adjustment for patient size  -iterative reconstruction      mGy-cm     Findings     Chest  Incidental scanning through the lower chest demonstrates clear lung  bases.     Abdomen  Liver and spleen unremarkable. Gallbladder is unremarkable. Pancreas and  adrenal glands are unremarkable.     2 small left renal cysts measuring 11 mm each. Kidneys otherwise normal  without obstructive uropathy. Normal caliber abdominal aorta.     No free air, free fluid, or lymphadenopathy.     There are dilated proximal/mid small bowel segments. Distal ileum in the  right lower quadrant is decompressed. Transition point appears to be a  small bowel segment adhered to the ventral mesh from previous hernia  repair. This may be serving as the point of obstruction. There is mild  edema adjacent to the dilated small bowel segments which can be measured  up to 3.2 cm diameter.     Much of the colon is contracted limiting its evaluation.     Pelvis  Nondistended urinary bladder. Uterus is unremarkable. There is a small  left ovarian follicle measuring around 2.3 cm. There is a small amount  of  free fluid in the pelvis. Normal appendix.     No fractures or bone lesions.          Impression:       High-grade small bowel obstruction secondary to a segment of small bowel  that is adhered/tethered to the infraumbilical ventral mesh material.  Small amount of free fluid in the pelvis.     A preliminary report was provided by [facility]. No discrepancy.  This report was finalized on 04/15/2019 08:08 by Dr Jay Jay Begum, .    XR Chest 1 View [[id number]] Collected:  04/15/19 0643     Updated:  04/15/19 0647    Narrative:       EXAMINATION:   XR CHEST 1 VW-  4/15/2019 6:43 AM CDT     HISTORY: Right upper quadrant pain     Frontal upright radiograph of the chest 4/14/2019 10:50 PM CDT     COMPARISON: None.     FINDINGS:   The lungs are clear. The cardiomediastinal silhouette and pulmonary  vascularity are within normal limits.      The osseous structures and surrounding soft tissues demonstrate no acute  abnormality.       Impression:       1. No radiographic evidence of acute cardiopulmonary process.        This report was finalized on 04/15/2019 06:44 by Dr. Maxim Kelly MD.          Orders (all)     Start     Ordered    04/16/19 0800  XR Abdomen Flat & Upright  1 Time Imaging      04/15/19 0848    04/16/19 0600  Basic Metabolic Panel  Daily      04/15/19 0848    04/16/19 0600  CBC & Differential  Daily      04/15/19 0848    04/15/19 0600  CBC (No Diff)  Morning Draw      04/15/19 0103    04/15/19 0400  Vital Signs Every 4 Hours  Every 4 Hours      04/15/19 0103    04/15/19 0112  ondansetron (ZOFRAN) injection 4 mg  Once      04/15/19 0110    04/15/19 0105  lactated ringers infusion  Continuous      04/15/19 0103    04/15/19 0104  NG tube to wall suction  Nursing Communication  Once     Comments:  NG tube to wall suction    04/15/19 0103    04/15/19 0103  ondansetron (ZOFRAN) injection 4 mg  Every 6 Hours PRN      04/15/19 0103    04/15/19 0103  NPO Diet  Diet Effective Now      04/15/19 0103    04/15/19 0102  morphine  injection 6 mg  Every 4 Hours PRN      04/15/19 0103    04/15/19 0102  naloxone (NARCAN) injection 0.4 mg  Every 5 Minutes PRN      04/15/19 0103    04/15/19 0102  Activity As Tolerated  Until Discontinued      04/15/19 0103    04/15/19 0102  Weigh Patient  Once      04/15/19 0103    04/15/19 0102  Inpatient Admission  Once      04/15/19 0103    04/15/19 0038  Surgery (on-call MD unless specified)  Once     Specialty:  General Surgery  Provider:  (Not yet assigned)    04/15/19 0037    04/15/19 0037  sodium chloride 0.9 % infusion  Continuous,   Status:  Discontinued      04/15/19 0035    04/15/19 0037  morphine injection 6 mg  Once      04/15/19 0035    04/15/19 0037  ondansetron (ZOFRAN) injection 4 mg  Once      04/15/19 0035    04/15/19 0036  Nasogastric tube insertion  Once      04/15/19 0035    04/14/19 2338  iopamidol (ISOVUE-300) 61 % injection 100 mL  Once in Imaging      04/14/19 2336    04/14/19 2206  sodium chloride 0.9 % bolus 1,000 mL  Once      04/14/19 2204    04/14/19 2206  morphine injection 4 mg  Once      04/14/19 2204 04/14/19 2206  ondansetron (ZOFRAN) injection 4 mg  Once      04/14/19 2204 04/14/19 2204  Urinalysis With Culture If Indicated - Urine, Clean Catch  Once      04/14/19 2204    04/14/19 2204  XR Chest 1 View  1 Time Imaging      04/14/19 2204    04/14/19 2204  CT Abdomen Pelvis With Contrast  1 Time Imaging     Comments:  IV CONTRAST ONLY      04/14/19 2204    04/14/19 2204  Insert peripheral IV  Once      04/14/19 2204 04/14/19 2203  sodium chloride 0.9 % flush 10 mL  As Needed      04/14/19 2204    04/14/19 2203  CBC & Differential  Once      04/14/19 2204    04/14/19 2203  Comprehensive Metabolic Panel  Once      04/14/19 2204    04/14/19 2203  Lipase  Once      04/14/19 2204    04/14/19 2203  Amylase  STAT      04/14/19 2204 04/14/19 2203  hCG, Serum, Qualitative  Once      04/14/19 2204 04/14/19 2203  CBC Auto Differential  PROCEDURE ONCE      04/14/19 2204     04/14/19 2113  ECG 12 Lead  Once      04/14/19 2112        Kaley Arauz, RN   Registered Nurse   Urology   Plan of Care   Signed   Date of Service:  4/15/2019  5:15 AM   Creation Time:  4/15/2019  5:15 AM            Signed           Problem: Patient Care Overview  Goal: Plan of Care Review  Outcome: Ongoing (interventions implemented as appropriate)    04/15/19 0508   Coping/Psychosocial   Plan of Care Reviewed With patient   Plan of Care Review   Progress no change   OTHER   Outcome Summary NG tube in left nare upon arrival from ER; no emesis reported; White count is 15.64; NG had 600 ml output when first inititated and cont' to have lt brown output; Pt is NPO for Dr Hazel         Problem: Bowel Obstruction (Adult)  Goal: Signs and Symptoms of Listed Potential Problems Will be Absent, Minimized or Managed (Bowel Obstruction)  Outcome: Ongoing (interventions implemented as appropriate)    04/15/19 0508   Goal/Outcome Evaluation   Problems Assessed (Bowel Obstruction) pain;diarrhea;all   Problems Present (Bowel Obstruction) pain;diarrhea

## 2019-04-15 NOTE — H&P
Charlette Hazel MD  H&P    Patient Care Team:  Dave Lu MD as PCP - General  Dave Lu MD as PCP - Family Medicine    Chief complaint abdominal pain nausea    Subjective     Kalyn Tinsley  is a 33 y.o. female presents with severe abdominal pain which began yesterday.  She has alternating constipation and diarrhea issues for long time.  Yesterday B she began having crampy abdominal pain that progressively worsened throughout the day associated with nausea but no vomiting.  She did have a relatively normal bowel movement yesterday but none since.  No gas.  No blood.  I performed incisional hernia repair with mesh several years ago and she had a tubal ligation that had become infected with significant cellulitis of her pannus prior to that .      Review of Systems   Pertinent items are noted in HPI, all other systems reviewed and negative    History  Past Medical History:   Diagnosis Date   • GERD (gastroesophageal reflux disease)    • Substance abuse (CMS/[facility])      Past Surgical History:   Procedure Laterality Date   • ANKLE SURGERY     • EAR TUBES     • HERNIA REPAIR     • INNER EAR SURGERY     • TUBAL ABDOMINAL LIGATION       Family History   Problem Relation Age of Onset   • No Known Problems Mother    • Hyperlipidemia Father    • No Known Problems Sister    • No Known Problems Brother    • No Known Problems Daughter    • No Known Problems Paternal Grandmother    • Heart attack Paternal Grandfather    • No Known Problems Sister      Social History     Tobacco Use   • Smoking status: Never Smoker   • Smokeless tobacco: Never Used   Substance Use Topics   • Alcohol use: No   • Drug use: No     Medications Prior to Admission   Medication Sig Dispense Refill Last Dose   • amoxicillin (AMOXIL) 875 MG tablet TAKE 1 TABLET TWICE A DAY  0 Taking   • diclofenac (VOLTAREN) 1 % gel gel Apply 4 g topically 3 (Three) Times a Day. 100 g 0    • guaifenesin-dextromethorphan (MUCINEX DM)  MG tablet sustained-release  12 hour tablet TAKE 1 TABLET BY MOUTH TWICE A DAY  0 Taking   • omeprazole (priLOSEC) 20 MG capsule TAKE 1 CAPSULE DAILY  3 Taking     Allergies:  Ceclor [cefaclor]    Objective     Vital Signs  Temp:  [97.5 °F (36.4 °C)-97.8 °F (36.6 °C)] 97.8 °F (36.6 °C)  Heart Rate:  [72-75] 74  Resp:  [16-18] 18  BP: (147-174)/(90-99) 147/99    Physical Exam:      General Appearance:    Alert, cooperative, in no acute distress   Head:    Normocephalic, without obvious abnormality, atraumatic   Eyes:            Lids and lashes normal, conjunctivae and sclerae normal, no   icterus, no pallor, corneas clear, PERRLA   Ears:    Ears appear intact with no abnormalities noted   Neck:   No adenopathy, supple, trachea midline   Back:     No kyphosis present, no scoliosis present, no skin lesions,      erythema or scars, no tenderness to percussion or                   palpation,   range of motion normal   Lungs:     Clear to auscultation,respirations regular, even and                  unlabored    Heart:    Regular rhythm and normal rate, normal S1 and S2, no            murmur, no gallop, no rub, no click   Chest Wall:    No abnormalities observed   Abdomen:    Softly distended somewhat tympanitic mild diffuse tenderness but no peritoneal signs.  Incision without hernia   Rectal:     Deferred   Extremities:   Moves all extremities well, no edema, no cyanosis, no             redness   Pulses:   Pulses palpable and equal bilaterally   Skin:   No bleeding, bruising or rash   Lymph nodes:   No palpable adenopathy   Neurologic:   No focal deficits       Results Review:      Lab Results (last 72 hours)     Procedure Component Value Units Date/Time    CBC (No Diff) [[id number]]  (Abnormal) Collected:  04/15/19 0431    Specimen:  Blood Updated:  04/15/19 0439     WBC 21.06 10*3/mm3      RBC 5.00 10*6/mm3      Hemoglobin 15.6 g/dL      Hematocrit 45.9 %      MCV 91.8 fL      MCH 31.2 pg      MCHC 34.0 g/dL      RDW 13.2 %      RDW-SD 44.5 fl       MPV 10.0 fL      Platelets 235 10*3/mm3     Urinalysis With Culture If Indicated - Urine, Clean Catch [[id number]]  (Abnormal) Collected:  04/15/19 0033    Specimen:  Urine, Clean Catch Updated:  04/15/19 0041     Color, UA Yellow     Appearance, UA Clear     pH, UA 6.0     Specific Gravity, UA >1.030     Glucose, UA Negative     Ketones, UA Negative     Bilirubin, UA Negative     Blood, UA Negative     Protein, UA Negative     Leuk Esterase, UA Negative     Nitrite, UA Negative     Urobilinogen, UA 0.2 E.U./dL    Narrative:       Urine microscopic not indicated.    Comprehensive Metabolic Panel [[id number]]  (Abnormal) Collected:  04/14/19 2226    Specimen:  Blood Updated:  04/14/19 2245     Glucose 134 mg/dL      BUN 13 mg/dL      Creatinine 0.68 mg/dL      Sodium 138 mmol/L      Potassium 4.0 mmol/L      Chloride 105 mmol/L      CO2 24.0 mmol/L      Calcium 9.6 mg/dL      Total Protein 7.3 g/dL      Albumin 4.50 g/dL      ALT (SGPT) 27 U/L      AST (SGOT) 20 U/L      Alkaline Phosphatase 85 U/L      Total Bilirubin 0.7 mg/dL      eGFR Non African Amer 99 mL/min/1.73      Globulin 2.8 gm/dL      A/G Ratio 1.6 g/dL      BUN/Creatinine Ratio 19.1     Anion Gap 9.0 mmol/L     Narrative:       GFR Normal >60  Chronic Kidney Disease <60  Kidney Failure <15    Lipase [[id number]]  (Normal) Collected:  04/14/19 2226    Specimen:  Blood Updated:  04/14/19 2245     Lipase 49 U/L     Amylase [[id number]]  (Normal) Collected:  04/14/19 2226    Specimen:  Blood Updated:  04/14/19 2245     Amylase 63 U/L     hCG, Serum, Qualitative [[id number]]  (Normal) Collected:  04/14/19 2226    Specimen:  Blood Updated:  04/14/19 2243     HCG Qualitative Negative    CBC & Differential [[id number]] Collected:  04/14/19 2226    Specimen:  Blood Updated:  04/14/19 2234    Narrative:       The following orders were created for panel order CBC & Differential.  Procedure                               Abnormality         Status                      ---------                               -----------         ------                     CBC Auto Differential[[id number]]        Abnormal            Final result                 Please view results for these tests on the individual orders.    CBC Auto Differential [[id number]]  (Abnormal) Collected:  04/14/19 2226    Specimen:  Blood Updated:  04/14/19 2234     WBC 15.64 10*3/mm3      RBC 5.20 10*6/mm3      Hemoglobin 16.3 g/dL      Hematocrit 46.1 %      MCV 88.7 fL      MCH 31.3 pg      MCHC 35.4 g/dL      RDW 13.1 %      RDW-SD 42.5 fl      MPV 10.3 fL      Platelets 305 10*3/mm3      Neutrophil % 87.9 %      Lymphocyte % 7.5 %      Monocyte % 3.6 %      Eosinophil % 0.4 %      Basophil % 0.2 %      Immature Grans % 0.4 %      Neutrophils, Absolute 13.74 10*3/mm3      Lymphocytes, Absolute 1.17 10*3/mm3      Monocytes, Absolute 0.56 10*3/mm3      Eosinophils, Absolute 0.07 10*3/mm3      Basophils, Absolute 0.03 10*3/mm3      Immature Grans, Absolute 0.07 10*3/mm3      nRBC 0.0 /100 WBC         Imaging Results (last 72 hours)     Procedure Component Value Units Date/Time    CT Abdomen Pelvis With Contrast [[id number]] Collected:  04/15/19 0759     Updated:  04/15/19 0812    Narrative:          History:  33-year-old with right upper quadrant abdominal pain. Probably biliary  colic.     Reference:  CT abdomen pelvis April 2016     Technique  Contrast-enhanced CT abdomen/pelvis was performed with coronal and  sagittal reformatted images provided.     For this CT exam, one or more of the following dose reduction techniques  was employed:  -automated exposure control  -mA and/or kVp adjustment for patient size  -iterative reconstruction      mGy-cm     Findings     Chest  Incidental scanning through the lower chest demonstrates clear lung  bases.     Abdomen  Liver and spleen unremarkable. Gallbladder is unremarkable. Pancreas and  adrenal glands are unremarkable.     2 small left renal cysts measuring 11 mm each.  Kidneys otherwise normal  without obstructive uropathy. Normal caliber abdominal aorta.     No free air, free fluid, or lymphadenopathy.     There are dilated proximal/mid small bowel segments. Distal ileum in the  right lower quadrant is decompressed. Transition point appears to be a  small bowel segment adhered to the ventral mesh from previous hernia  repair. This may be serving as the point of obstruction. There is mild  edema adjacent to the dilated small bowel segments which can be measured  up to 3.2 cm diameter.     Much of the colon is contracted limiting its evaluation.     Pelvis  Nondistended urinary bladder. Uterus is unremarkable. There is a small  left ovarian follicle measuring around 2.3 cm. There is a small amount  of free fluid in the pelvis. Normal appendix.     No fractures or bone lesions.          Impression:       High-grade small bowel obstruction secondary to a segment of small bowel  that is adhered/tethered to the infraumbilical ventral mesh material.  Small amount of free fluid in the pelvis.     A preliminary report was provided by [facility]. No discrepancy.  This report was finalized on 04/15/2019 08:08 by Dr Jay Jay Begum, .    XR Chest 1 View [[id number]] Collected:  04/15/19 0643     Updated:  04/15/19 0647    Narrative:       EXAMINATION:   XR CHEST 1 VW-  4/15/2019 6:43 AM CDT     HISTORY: Right upper quadrant pain     Frontal upright radiograph of the chest 4/14/2019 10:50 PM CDT     COMPARISON: None.     FINDINGS:   The lungs are clear. The cardiomediastinal silhouette and pulmonary  vascularity are within normal limits.      The osseous structures and surrounding soft tissues demonstrate no acute  abnormality.       Impression:       1. No radiographic evidence of acute cardiopulmonary process.        This report was finalized on 04/15/2019 06:44 by Dr. Maxim Kelly MD.          Assessment/Plan       SBO (small bowel obstruction) (CMS/[facility])      NG tube, hydration, observation.   Operative versus nonoperative management of bowel obstructions as well as the possibility of small bowel follow-through will be necessary all were discussed      Charlette Hazel MD  04/15/19  8:43 AM

## 2019-04-15 NOTE — ED PROVIDER NOTES
Subjective   This is a 33-year-old female who presents to the emergency department for evaluation of abdominal pain with associated nausea.  Patient reports onset around 10 AM this morning after eating a bowl of cereal.  It has been worsening throughout the day.  No specific aggravating or relieving factors.  She has not been vomiting.  No flank or back pain.  No lower abdominal or pelvic discomfort.  No dysuria or hematuria.  No diarrhea, constipation, or blood in the stool.  No fever or shaking chills.  She does not feel short of breath.  No cough or wheezing.  No chest pain, heart racing, or dizziness.  She has not taken any medications for relief of her discomfort but presents as they have not yet resolved.  No history of previous similar.  Review of systems otherwise negative.  She has no additional complaints.            Review of Systems   All other systems reviewed and are negative.      Past Medical History:   Diagnosis Date   • GERD (gastroesophageal reflux disease)    • Substance abuse (CMS/[facility])        Allergies   Allergen Reactions   • Ceclor [Cefaclor]      Hives       Past Surgical History:   Procedure Laterality Date   • ANKLE SURGERY     • EAR TUBES     • HERNIA REPAIR     • INNER EAR SURGERY     • TUBAL ABDOMINAL LIGATION         Family History   Problem Relation Age of Onset   • No Known Problems Mother    • Hyperlipidemia Father    • No Known Problems Sister    • No Known Problems Brother    • No Known Problems Daughter    • No Known Problems Paternal Grandmother    • Heart attack Paternal Grandfather    • No Known Problems Sister        Social History     Socioeconomic History   • Marital status:      Spouse name: Not on file   • Number of children: Not on file   • Years of education: Not on file   • Highest education level: Not on file   Tobacco Use   • Smoking status: Current Some Day Smoker     Years: 15.00     Types: Electronic Cigarette   • Smokeless tobacco: Never Used   • Tobacco  comment: vape   Substance and Sexual Activity   • Alcohol use: No   • Drug use: No   • Sexual activity: Defer     Partners: Male     Birth control/protection: None           Objective   Physical Exam   Constitutional: She appears well-developed and well-nourished.   Eyes: EOM are normal. Pupils are equal, round, and reactive to light.   Neck: Normal range of motion. Neck supple. No JVD present. No tracheal deviation present.   Cardiovascular: Normal rate, regular rhythm and normal heart sounds.   Pulmonary/Chest: Effort normal and breath sounds normal. No respiratory distress. She has no wheezes. She exhibits no tenderness.   Abdominal: Soft. Bowel sounds are normal. She exhibits no distension. There is tenderness in the right upper quadrant, epigastric area and left upper quadrant. There is no rigidity, no rebound, no guarding, no CVA tenderness, no tenderness at McBurney's point and negative Hillman's sign.   Musculoskeletal: She exhibits no edema or deformity.   Neurological: She is alert.   Skin: Skin is warm and dry. Capillary refill takes less than 2 seconds.   Psychiatric: She has a normal mood and affect. Her behavior is normal.   Nursing note and vitals reviewed.      Procedures           ED Course      Patient endorsed to me waiting on CT:    CT shows SBO with transition along the anterior abdominal wall likely from adhesions. The more distal small bowel is completely decompressed suggesting a high grade obstruction.     She has continued pain to her right mid and upper abdominal pain, no guarding, no rigidity. She does share she has not passed gas in 24 hours.     Dr. Salvador accepts,     NG tube being placed.       Family, social and past history reviewed as below, prior documentation of H and Ps and other documentation are reviewed:    Past Medical History:   Diagnosis Date   • GERD (gastroesophageal reflux disease)    • Substance abuse (CMS/[facility])        Past Surgical History:   Procedure Laterality Date    • ANKLE SURGERY     • EAR TUBES     • HERNIA REPAIR     • INNER EAR SURGERY     • TUBAL ABDOMINAL LIGATION         Social History     Socioeconomic History   • Marital status:      Spouse name: Not on file   • Number of children: Not on file   • Years of education: Not on file   • Highest education level: Not on file   Tobacco Use   • Smoking status: Current Some Day Smoker     Years: 15.00     Types: Electronic Cigarette   • Smokeless tobacco: Never Used   • Tobacco comment: vape   Substance and Sexual Activity   • Alcohol use: No   • Drug use: No   • Sexual activity: Defer     Partners: Male     Birth control/protection: None       Family history: reviewed and      ED Course as of Apr 15 0058   Sun Apr 14, 2019   2313 WBC: (!) 15.64 [MW]      ED Course User Index  [MW] Alberto Peterson, DO      IV fluids, morphine, and Zofran ordered while we await labs and imaging    EKG at 2117 shows a sinus rhythm with a rate of 64 bpm.  Intervals within normal limits.  Normal axis.  R wave progression is maintained.  No T-wave inversion.  No ST segment depression/elevation or evidence for acute ischemia/infarction.    Labs reviewed    X-ray of the chest as interpreted by myself shows no acute cardiopulmonary process    CT scan, reassessment, and disposition are pending at shift change            MDM  Patient with significant right upper quadrant pain  She is afebrile  Negative Hillman sign  Presumed gallbladder source  Mild leukocytosis  Benign x-ray of the chest    CT scan, reassessment, and disposition are pending at shift change      Final diagnoses:   SBO (small bowel obstruction) (CMS/[facility])            Zoltan Hummel MD  04/15/19 0437

## 2019-04-15 NOTE — PLAN OF CARE
Problem: Patient Care Overview  Goal: Plan of Care Review  Outcome: Ongoing (interventions implemented as appropriate)   04/15/19 0508   Coping/Psychosocial   Plan of Care Reviewed With patient   Plan of Care Review   Progress no change   OTHER   Outcome Summary NG tube in left nare upon arrival from ER; no emesis reported; White count is 15.64; NG had 600 ml output when first inititated and cont' to have lt brown output; Pt is NPO for Dr Hazel       Problem: Bowel Obstruction (Adult)  Goal: Signs and Symptoms of Listed Potential Problems Will be Absent, Minimized or Managed (Bowel Obstruction)  Outcome: Ongoing (interventions implemented as appropriate)   04/15/19 0508   Goal/Outcome Evaluation   Problems Assessed (Bowel Obstruction) pain;diarrhea;all   Problems Present (Bowel Obstruction) pain;diarrhea

## 2019-04-15 NOTE — PLAN OF CARE
Problem: Patient Care Overview  Goal: Plan of Care Review  Outcome: Ongoing (interventions implemented as appropriate)   04/15/19 1525   Coping/Psychosocial   Plan of Care Reviewed With patient;mother   Plan of Care Review   Progress no change   OTHER   Outcome Summary Patient ambulated in hallway,  with thin brown/red output, no BS, patient c/o abd pain see MAR, no c/o N/V, patient resting comfortably, will continue to monitor.     Goal: Individualization and Mutuality  Outcome: Ongoing (interventions implemented as appropriate)    Goal: Discharge Needs Assessment  Outcome: Ongoing (interventions implemented as appropriate)   04/15/19 1525   Discharge Needs Assessment   Readmission Within the Last 30 Days no previous admission in last 30 days   Concerns to be Addressed no discharge needs identified   Patient/Family Anticipates Transition to home   Patient/Family Anticipated Services at Transition none   Transportation Concerns car, none   Transportation Anticipated car, drives self;family or friend will provide   Anticipated Changes Related to Illness none   Equipment Needed After Discharge none   Disability   Equipment Currently Used at Home none     Goal: Interprofessional Rounds/Family Conf  Outcome: Ongoing (interventions implemented as appropriate)   04/15/19 1525   Interdisciplinary Rounds/Family Conf   Participants family;nursing;patient;physician       Problem: Bowel Obstruction (Adult)  Intervention: Monitor/Manage Gastrointestinal Function/Elimination   04/15/19 1525   Activity   Activity Management activity adjusted per tolerance;ambulated in betancur   Monitor/Manage Chemotherapy Gastrointestinal Effects   Bowel Dysfunction Management relaxation techniques promoted   Gastrointestinal (GI) Interventions   Diarrhea Management fluids promoted     Intervention: Monitor/Manage Pain   04/15/19 0800 04/15/19 1525   Promote Oxygenation/Perfusion   Pain Management Interventions --  see MAR;quiet environment  facilitated;position adjusted   Safety Management   Medication Review/Management medications reviewed --      Intervention: Support/Optimize Psychosocial Response to Illness   04/15/19 1525   Coping/Psychosocial Interventions   Supportive Measures active listening utilized   Psychosocial Support   Family/Support System Care presence promoted     Intervention: Monitor/Manage Fluid Electrolyte Balance   04/15/19 1525   Nutrition Interventions   Fluid/Electrolyte Management intravenous fluids adjusted       Goal: Signs and Symptoms of Listed Potential Problems Will be Absent, Minimized or Managed (Bowel Obstruction)  Outcome: Ongoing (interventions implemented as appropriate)   04/15/19 1525   Goal/Outcome Evaluation   Problems Assessed (Bowel Obstruction) all   Problems Present (Bowel Obstruction) pain;situational response

## 2019-04-16 ENCOUNTER — APPOINTMENT (OUTPATIENT)
Dept: GENERAL RADIOLOGY | Facility: HOSPITAL | Age: 34
End: 2019-04-16

## 2019-04-16 LAB
ANION GAP SERPL CALCULATED.3IONS-SCNC: 7 MMOL/L (ref 4–13)
B-HCG UR QL: NEGATIVE
BASOPHILS # BLD AUTO: 0.03 10*3/MM3 (ref 0–0.2)
BASOPHILS NFR BLD AUTO: 0.4 % (ref 0–2)
BUN BLD-MCNC: 8 MG/DL (ref 5–21)
BUN/CREAT SERPL: 12.1 (ref 7–25)
CALCIUM SPEC-SCNC: 8.2 MG/DL (ref 8.4–10.4)
CHLORIDE SERPL-SCNC: 100 MMOL/L (ref 98–110)
CO2 SERPL-SCNC: 30 MMOL/L (ref 24–31)
CREAT BLD-MCNC: 0.66 MG/DL (ref 0.5–1.4)
DEPRECATED RDW RBC AUTO: 44.7 FL (ref 40–54)
EOSINOPHIL # BLD AUTO: 0.16 10*3/MM3 (ref 0–0.7)
EOSINOPHIL NFR BLD AUTO: 1.9 % (ref 0–4)
ERYTHROCYTE [DISTWIDTH] IN BLOOD BY AUTOMATED COUNT: 13 % (ref 12–15)
GFR SERPL CREATININE-BSD FRML MDRD: 103 ML/MIN/1.73
GLUCOSE BLD-MCNC: 85 MG/DL (ref 70–100)
HCT VFR BLD AUTO: 41.7 % (ref 37–47)
HGB BLD-MCNC: 14 G/DL (ref 12–16)
IMM GRANULOCYTES # BLD AUTO: 0.03 10*3/MM3 (ref 0–0.05)
IMM GRANULOCYTES NFR BLD AUTO: 0.4 % (ref 0–5)
LYMPHOCYTES # BLD AUTO: 1.39 10*3/MM3 (ref 0.72–4.86)
LYMPHOCYTES NFR BLD AUTO: 16.5 % (ref 15–45)
MCH RBC QN AUTO: 31.5 PG (ref 28–32)
MCHC RBC AUTO-ENTMCNC: 33.6 G/DL (ref 33–36)
MCV RBC AUTO: 93.7 FL (ref 82–98)
MONOCYTES # BLD AUTO: 0.57 10*3/MM3 (ref 0.19–1.3)
MONOCYTES NFR BLD AUTO: 6.8 % (ref 4–12)
NEUTROPHILS # BLD AUTO: 6.24 10*3/MM3 (ref 1.87–8.4)
NEUTROPHILS NFR BLD AUTO: 74 % (ref 39–78)
NRBC BLD AUTO-RTO: 0 /100 WBC (ref 0–0)
PLATELET # BLD AUTO: 179 10*3/MM3 (ref 130–400)
PMV BLD AUTO: 10.6 FL (ref 6–12)
POTASSIUM BLD-SCNC: 3.6 MMOL/L (ref 3.5–5.3)
RBC # BLD AUTO: 4.45 10*6/MM3 (ref 4.2–5.4)
SODIUM BLD-SCNC: 137 MMOL/L (ref 135–145)
WBC NRBC COR # BLD: 8.42 10*3/MM3 (ref 4.8–10.8)

## 2019-04-16 PROCEDURE — 74019 RADEX ABDOMEN 2 VIEWS: CPT

## 2019-04-16 PROCEDURE — 25010000002 MORPHINE SULFATE (PF) 2 MG/ML SOLUTION: Performed by: SPECIALIST

## 2019-04-16 PROCEDURE — 74250 X-RAY XM SM INT 1CNTRST STD: CPT

## 2019-04-16 PROCEDURE — 0 DIATRIZOATE MEGLUMINE & SODIUM PER 1 ML: Performed by: SPECIALIST

## 2019-04-16 PROCEDURE — 80048 BASIC METABOLIC PNL TOTAL CA: CPT | Performed by: SPECIALIST

## 2019-04-16 PROCEDURE — 25010000002 ONDANSETRON PER 1 MG: Performed by: SPECIALIST

## 2019-04-16 PROCEDURE — 81025 URINE PREGNANCY TEST: CPT | Performed by: SPECIALIST

## 2019-04-16 PROCEDURE — 85025 COMPLETE CBC W/AUTO DIFF WBC: CPT | Performed by: SPECIALIST

## 2019-04-16 RX ORDER — ACETAMINOPHEN 325 MG/1
650 TABLET ORAL EVERY 4 HOURS PRN
Status: DISCONTINUED | OUTPATIENT
Start: 2019-04-16 | End: 2019-04-17 | Stop reason: HOSPADM

## 2019-04-16 RX ORDER — LORATADINE 10 MG/1
10 TABLET ORAL AS NEEDED
COMMUNITY

## 2019-04-16 RX ORDER — OMEPRAZOLE 40 MG/1
20 CAPSULE, DELAYED RELEASE ORAL DAILY
COMMUNITY

## 2019-04-16 RX ADMIN — MORPHINE SULFATE 6 MG: 2 INJECTION, SOLUTION INTRAMUSCULAR; INTRAVENOUS at 13:41

## 2019-04-16 RX ADMIN — MORPHINE SULFATE 6 MG: 2 INJECTION, SOLUTION INTRAMUSCULAR; INTRAVENOUS at 08:41

## 2019-04-16 RX ADMIN — ONDANSETRON HYDROCHLORIDE 4 MG: 2 INJECTION, SOLUTION INTRAMUSCULAR; INTRAVENOUS at 10:53

## 2019-04-16 RX ADMIN — SODIUM CHLORIDE, POTASSIUM CHLORIDE, SODIUM LACTATE AND CALCIUM CHLORIDE 125 ML/HR: 600; 310; 30; 20 INJECTION, SOLUTION INTRAVENOUS at 10:53

## 2019-04-16 RX ADMIN — MORPHINE SULFATE 2 MG: 2 INJECTION, SOLUTION INTRAMUSCULAR; INTRAVENOUS at 17:59

## 2019-04-16 RX ADMIN — MORPHINE SULFATE 6 MG: 2 INJECTION, SOLUTION INTRAMUSCULAR; INTRAVENOUS at 03:22

## 2019-04-16 RX ADMIN — DIATRIZOATE MEGLUMINE AND DIATRIZOATE SODIUM 360 ML: 660; 100 LIQUID ORAL; RECTAL at 11:30

## 2019-04-16 RX ADMIN — ONDANSETRON HYDROCHLORIDE 4 MG: 2 INJECTION, SOLUTION INTRAMUSCULAR; INTRAVENOUS at 03:22

## 2019-04-16 RX ADMIN — SODIUM CHLORIDE, POTASSIUM CHLORIDE, SODIUM LACTATE AND CALCIUM CHLORIDE 125 ML/HR: 600; 310; 30; 20 INJECTION, SOLUTION INTRAVENOUS at 03:09

## 2019-04-16 NOTE — PLAN OF CARE
Problem: Patient Care Overview  Goal: Plan of Care Review  Outcome: Ongoing (interventions implemented as appropriate)  ivf continue. Ng to lws. Npo with sips and chips. No bowel sounds noted. Voiding without difficulty. abd pain some better, but has headache and ear hurts. Cont to monitor.   04/16/19 0111   Coping/Psychosocial   Plan of Care Reviewed With patient   Plan of Care Review   Progress no change     Goal: Individualization and Mutuality  Outcome: Ongoing (interventions implemented as appropriate)    Goal: Discharge Needs Assessment  Outcome: Ongoing (interventions implemented as appropriate)      Problem: Bowel Obstruction (Adult)  Goal: Signs and Symptoms of Listed Potential Problems Will be Absent, Minimized or Managed (Bowel Obstruction)  Outcome: Ongoing (interventions implemented as appropriate)

## 2019-04-16 NOTE — PLAN OF CARE
Problem: Patient Care Overview  Goal: Plan of Care Review  Outcome: Ongoing (interventions implemented as appropriate)   04/16/19 8999   Coping/Psychosocial   Plan of Care Reviewed With patient;family;spouse   Plan of Care Review   Progress improving   OTHER   Outcome Summary Pt went for a small bowel follow through this am. Multiple BMs. NG tube removed this afternoon. Pt reports much pain relief after NG tube removal. Advanced to clear liquids, tolerating well. Up ad basil. Voiding. VSS. Will continue to monitor.       Problem: Bowel Obstruction (Adult)  Goal: Signs and Symptoms of Listed Potential Problems Will be Absent, Minimized or Managed (Bowel Obstruction)  Outcome: Ongoing (interventions implemented as appropriate)   04/16/19 8930   Goal/Outcome Evaluation   Problems Assessed (Bowel Obstruction) all   Problems Present (Bowel Obstruction) pain

## 2019-04-17 VITALS
HEIGHT: 66 IN | WEIGHT: 209.44 LBS | SYSTOLIC BLOOD PRESSURE: 127 MMHG | TEMPERATURE: 98.1 F | HEART RATE: 77 BPM | DIASTOLIC BLOOD PRESSURE: 76 MMHG | OXYGEN SATURATION: 97 % | BODY MASS INDEX: 33.66 KG/M2 | RESPIRATION RATE: 16 BRPM

## 2019-04-17 LAB
ANION GAP SERPL CALCULATED.3IONS-SCNC: 9 MMOL/L (ref 4–13)
BASOPHILS # BLD AUTO: 0.03 10*3/MM3 (ref 0–0.2)
BASOPHILS NFR BLD AUTO: 0.5 % (ref 0–2)
BUN BLD-MCNC: 6 MG/DL (ref 5–21)
BUN/CREAT SERPL: 10.2 (ref 7–25)
CALCIUM SPEC-SCNC: 8.7 MG/DL (ref 8.4–10.4)
CHLORIDE SERPL-SCNC: 100 MMOL/L (ref 98–110)
CO2 SERPL-SCNC: 28 MMOL/L (ref 24–31)
CREAT BLD-MCNC: 0.59 MG/DL (ref 0.5–1.4)
DEPRECATED RDW RBC AUTO: 41.8 FL (ref 40–54)
EOSINOPHIL # BLD AUTO: 0.19 10*3/MM3 (ref 0–0.7)
EOSINOPHIL NFR BLD AUTO: 3.1 % (ref 0–4)
ERYTHROCYTE [DISTWIDTH] IN BLOOD BY AUTOMATED COUNT: 12.6 % (ref 12–15)
GFR SERPL CREATININE-BSD FRML MDRD: 117 ML/MIN/1.73
GLUCOSE BLD-MCNC: 85 MG/DL (ref 70–100)
HCT VFR BLD AUTO: 38.8 % (ref 37–47)
HGB BLD-MCNC: 13.4 G/DL (ref 12–16)
IMM GRANULOCYTES # BLD AUTO: 0.02 10*3/MM3 (ref 0–0.05)
IMM GRANULOCYTES NFR BLD AUTO: 0.3 % (ref 0–5)
LYMPHOCYTES # BLD AUTO: 1.33 10*3/MM3 (ref 0.72–4.86)
LYMPHOCYTES NFR BLD AUTO: 21.9 % (ref 15–45)
MCH RBC QN AUTO: 31.1 PG (ref 28–32)
MCHC RBC AUTO-ENTMCNC: 34.5 G/DL (ref 33–36)
MCV RBC AUTO: 90 FL (ref 82–98)
MONOCYTES # BLD AUTO: 0.45 10*3/MM3 (ref 0.19–1.3)
MONOCYTES NFR BLD AUTO: 7.4 % (ref 4–12)
NEUTROPHILS # BLD AUTO: 4.06 10*3/MM3 (ref 1.87–8.4)
NEUTROPHILS NFR BLD AUTO: 66.8 % (ref 39–78)
NRBC BLD AUTO-RTO: 0 /100 WBC (ref 0–0)
PLATELET # BLD AUTO: 184 10*3/MM3 (ref 130–400)
PMV BLD AUTO: 10.4 FL (ref 6–12)
POTASSIUM BLD-SCNC: 3.6 MMOL/L (ref 3.5–5.3)
RBC # BLD AUTO: 4.31 10*6/MM3 (ref 4.2–5.4)
SODIUM BLD-SCNC: 137 MMOL/L (ref 135–145)
WBC NRBC COR # BLD: 6.08 10*3/MM3 (ref 4.8–10.8)

## 2019-04-17 PROCEDURE — 85025 COMPLETE CBC W/AUTO DIFF WBC: CPT | Performed by: SPECIALIST

## 2019-04-17 PROCEDURE — 80048 BASIC METABOLIC PNL TOTAL CA: CPT | Performed by: SPECIALIST

## 2019-04-17 RX ADMIN — ACETAMINOPHEN 650 MG: 325 TABLET ORAL at 06:18

## 2019-04-17 NOTE — DISCHARGE SUMMARY
Charlette Hazel MD Discharge Summary    Date of Admission: 4/14/2019  Date of Discharge:  4/17/2019    Discharge Diagnosis: Bowel obstruction    Presenting Problem/History of Present Illness    History and Physical as outlined in the chart    Hospital Course  Patient was admitted through the emergency department with small bowel obstruction seen on CT scan.  There appeared to be a transition point in the anterior abdominal wall and underneath the mesh from her prior incisional hernia repair.  NG tube decompression and hydration were instituted.  She responded well to this and a small bowel follow-through was performed which showed good flow without evidence of obstruction.  Bowels began moving.  Her diet was advanced to clear liquids and full's this morning which she is tolerating.  If she tolerates lunch will be discharged home.    See medication reconciliation for medications at discharge.    Procedures Performed         Consults:   Consults     Date and Time Order Name Status Description    4/15/2019 0037 Surgery (on-call MD unless specified)            Condition on Discharge:  stable      Discharge Disposition  Home or Self Care    Discharge Medications     Discharge Medications      Continue These Medications      Instructions Start Date   CLARITIN 10 MG tablet  Generic drug:  loratadine   10 mg, Oral, Daily      omeprazole 20 MG capsule  Commonly known as:  priLOSEC   20 mg, Oral, Daily             Discharge Diet:     Activity at Discharge:     Follow-up Appointments  No future appointments.  Additional Instructions for the Follow-ups that You Need to Schedule     Discharge Follow-up with Specified Provider: me   As directed      To:  me    Follow Up Details:  prn               Test Results Pending at Discharge       Charlette Hazel MD  04/17/19  8:44 AM    Time: Time spent at discharge 30 minutes

## 2019-04-17 NOTE — PAYOR COMM NOTE
"Kalyn Blandon (33 y.o. Female)     Date of Birth Social Security Number Address Home Phone MRN    1985  [address] [phone] [id number]    [facility] Mar[facility] Status          Other        Admission Date Admission Type Admitting Provider Attending Provider Department, Room/Bed    4/14/19 Emergency Charlette Hazel MD  [facility] 3C, 388/1    Discharge Date Discharge Disposition Discharge Destination        4/17/2019 Home or Self Care              Attending Provider:  (none)   Allergies:  Ceclor [Cefaclor]    Isolation:  None   Infection:  None   Code Status:  CPR    Ht:  167.6 cm (65.98\")   Wt:  95 kg (209 lb 7 oz)    Admission Cmt:  None   Principal Problem:  None                Active Insurance as of 4/14/2019     Primary Coverage     Payor Plan Insurance Group Employer/Plan Group    [facility] [employer] [id number]     Payor Plan Address Payor Plan Phone Number Payor Plan Fax Number Effective Dates    [address] [phone]  11/1/2018 - None Entered    [place] [address]       Subscriber Name Subscriber Birth Date Member ID       EDINSON BLANDON 7/9/1975 [id number]                 Emergency Contacts      (Rel.) Home Phone Work Phone Mobile Phone    Edinson Blandon (Spouse) [phone] -- --        Charlette Hazel MD   Physician   Surgery   Discharge Summary   Signed   Date of Service:  4/17/2019  8:44 AM   Creation Time:  4/17/2019  8:44 AM            Signed                 Show:Clear all  [x]Manual[x]Template[]Copied    Added by:  [x]Charlette Hazel MD      []Shalom for details      Charlette Hazel MD Discharge Summary     Date of Admission: 4/14/2019  Date of Discharge:  4/17/2019     Discharge Diagnosis: Bowel obstruction     Presenting Problem/History of Present Illness     History and Physical as outlined in the chart     Hospital Course  Patient was admitted through the emergency department with small bowel " obstruction seen on CT scan.  There appeared to be a transition point in the anterior abdominal wall and underneath the mesh from her prior incisional hernia repair.  NG tube decompression and hydration were instituted.  She responded well to this and a small bowel follow-through was performed which showed good flow without evidence of obstruction.  Bowels began moving.  Her diet was advanced to clear liquids and full's this morning which she is tolerating.  If she tolerates lunch will be discharged home.    See medication reconciliation for medications at discharge.     Procedures Performed        Consults:           Consults      Date and Time Order Name Status Description     4/15/2019 0037 Surgery (on-call MD unless specified)                 Condition on Discharge:  stable        Discharge Disposition  Home or Self Care     Discharge Medications           Discharge Medications            Continue These Medications      Instructions Start Date   CLARITIN 10 MG tablet  Generic drug:  loratadine    10 mg, Oral, Daily        omeprazole 20 MG capsule  Commonly known as:  priLOSEC    20 mg, Oral, Daily                  Discharge Diet:      Activity at Discharge:      Follow-up Appointments  No future appointments.       Additional Instructions for the Follow-ups that You Need to Schedule      Discharge Follow-up with Specified Provider: me   As directed        To:  me    Follow Up Details:  prn                   Test Results Pending at Discharge     Charlette Hazel MD  04/17/19  8:44 AM     Time: Time spent at discharge 30 minutes                                 ED to Hosp-Admission (Discharged) on 4/14/2019            Routing History            Detailed Report            Discharge Order (From admission, onward)    Start     Ordered    04/17/19 0844  Discharge patient  Once     Expected Discharge Date:  04/17/19    Discharge Disposition:  Home or Self Care    Physician of Record for Attribution - Please select from  Treatment Team:  CHERIE MORLEY [[id number]]    Review needed by CMO to determine Physician of Record:  No       Question Answer Comment   Physician of Record for Attribution - Please select from Treatment Team CHERIE MORLEY    Review needed by CMO to determine Physician of Record No        04/17/19 0844

## 2019-04-17 NOTE — PLAN OF CARE
Problem: Patient Care Overview  Goal: Plan of Care Review  Outcome: Ongoing (interventions implemented as appropriate)  Resting without distress. Denies pain/n/v. Voiding without difficulty. Tolerated cl liq. Advance to full liq at breakfast this am.  Cont to monitor.   04/17/19 0216   Coping/Psychosocial   Plan of Care Reviewed With patient   Plan of Care Review   Progress improving     Goal: Individualization and Mutuality  Outcome: Ongoing (interventions implemented as appropriate)    Goal: Discharge Needs Assessment  Outcome: Ongoing (interventions implemented as appropriate)      Problem: Bowel Obstruction (Adult)  Goal: Signs and Symptoms of Listed Potential Problems Will be Absent, Minimized or Managed (Bowel Obstruction)  Outcome: Ongoing (interventions implemented as appropriate)

## 2019-05-28 PROCEDURE — 87186 SC STD MICRODIL/AGAR DIL: CPT | Performed by: FAMILY MEDICINE

## 2019-05-28 PROCEDURE — 87086 URINE CULTURE/COLONY COUNT: CPT | Performed by: FAMILY MEDICINE

## 2019-05-28 PROCEDURE — 87088 URINE BACTERIA CULTURE: CPT | Performed by: FAMILY MEDICINE

## 2019-07-06 ENCOUNTER — NURSE TRIAGE (OUTPATIENT)
Dept: CALL CENTER | Facility: HOSPITAL | Age: 34
End: 2019-07-06

## 2019-07-06 ENCOUNTER — APPOINTMENT (OUTPATIENT)
Dept: GENERAL RADIOLOGY | Facility: HOSPITAL | Age: 34
End: 2019-07-06

## 2019-07-06 ENCOUNTER — HOSPITAL ENCOUNTER (EMERGENCY)
Facility: HOSPITAL | Age: 34
Discharge: HOME | End: 2019-07-06
Admitting: EMERGENCY MEDICINE

## 2019-07-06 VITALS
RESPIRATION RATE: 16 BRPM | HEART RATE: 59 BPM | OXYGEN SATURATION: 96 % | HEIGHT: 66 IN | DIASTOLIC BLOOD PRESSURE: 87 MMHG | BODY MASS INDEX: 34.23 KG/M2 | WEIGHT: 213 LBS | SYSTOLIC BLOOD PRESSURE: 130 MMHG | TEMPERATURE: 98.3 F

## 2019-07-06 DIAGNOSIS — K59.00 CONSTIPATION, UNSPECIFIED CONSTIPATION TYPE: Primary | ICD-10-CM

## 2019-07-06 LAB
ALBUMIN SERPL-MCNC: 3.9 G/DL (ref 3.5–5)
ALBUMIN/GLOB SERPL: 1.5 G/DL (ref 1.1–2.5)
ALP SERPL-CCNC: 68 U/L (ref 24–120)
ALT SERPL W P-5'-P-CCNC: 21 U/L (ref 0–54)
ANION GAP SERPL CALCULATED.3IONS-SCNC: 6 MMOL/L (ref 4–13)
AST SERPL-CCNC: 25 U/L (ref 7–45)
BASOPHILS # BLD AUTO: 0.04 10*3/MM3 (ref 0–0.2)
BASOPHILS NFR BLD AUTO: 0.6 % (ref 0–2)
BILIRUB SERPL-MCNC: 0.8 MG/DL (ref 0.1–1)
BILIRUB UR QL STRIP: NEGATIVE
BUN BLD-MCNC: 8 MG/DL (ref 5–21)
BUN/CREAT SERPL: 12.3 (ref 7–25)
CALCIUM SPEC-SCNC: 8.8 MG/DL (ref 8.4–10.4)
CHLORIDE SERPL-SCNC: 104 MMOL/L (ref 98–110)
CLARITY UR: CLEAR
CO2 SERPL-SCNC: 29 MMOL/L (ref 24–31)
COLOR UR: YELLOW
CREAT BLD-MCNC: 0.65 MG/DL (ref 0.5–1.4)
DEPRECATED RDW RBC AUTO: 39.8 FL (ref 40–54)
EOSINOPHIL # BLD AUTO: 0.21 10*3/MM3 (ref 0–0.7)
EOSINOPHIL NFR BLD AUTO: 3 % (ref 0–4)
ERYTHROCYTE [DISTWIDTH] IN BLOOD BY AUTOMATED COUNT: 12.1 % (ref 12–15)
GFR SERPL CREATININE-BSD FRML MDRD: 104 ML/MIN/1.73
GLOBULIN UR ELPH-MCNC: 2.6 GM/DL
GLUCOSE BLD-MCNC: 90 MG/DL (ref 70–100)
GLUCOSE UR STRIP-MCNC: NEGATIVE MG/DL
HCT VFR BLD AUTO: 42.7 % (ref 37–47)
HGB BLD-MCNC: 14.6 G/DL (ref 12–16)
HGB UR QL STRIP.AUTO: NEGATIVE
IMM GRANULOCYTES # BLD AUTO: 0.02 10*3/MM3 (ref 0–0.05)
IMM GRANULOCYTES NFR BLD AUTO: 0.3 % (ref 0–5)
KETONES UR QL STRIP: NEGATIVE
LEUKOCYTE ESTERASE UR QL STRIP.AUTO: NEGATIVE
LIPASE SERPL-CCNC: 74 U/L (ref 23–203)
LYMPHOCYTES # BLD AUTO: 1.43 10*3/MM3 (ref 0.72–4.86)
LYMPHOCYTES NFR BLD AUTO: 20.3 % (ref 15–45)
MCH RBC QN AUTO: 30.9 PG (ref 28–32)
MCHC RBC AUTO-ENTMCNC: 34.2 G/DL (ref 33–36)
MCV RBC AUTO: 90.3 FL (ref 82–98)
MONOCYTES # BLD AUTO: 0.47 10*3/MM3 (ref 0.19–1.3)
MONOCYTES NFR BLD AUTO: 6.7 % (ref 4–12)
NEUTROPHILS # BLD AUTO: 4.87 10*3/MM3 (ref 1.87–8.4)
NEUTROPHILS NFR BLD AUTO: 69.1 % (ref 39–78)
NITRITE UR QL STRIP: NEGATIVE
NRBC BLD AUTO-RTO: 0 /100 WBC (ref 0–0.2)
PH UR STRIP.AUTO: 7.5 [PH] (ref 5–8)
PLATELET # BLD AUTO: 200 10*3/MM3 (ref 130–400)
PMV BLD AUTO: 10.5 FL (ref 6–12)
POTASSIUM BLD-SCNC: 4.5 MMOL/L (ref 3.5–5.3)
PROT SERPL-MCNC: 6.5 G/DL (ref 6.3–8.7)
PROT UR QL STRIP: NEGATIVE
RBC # BLD AUTO: 4.73 10*6/MM3 (ref 4.2–5.4)
SODIUM BLD-SCNC: 139 MMOL/L (ref 135–145)
SP GR UR STRIP: 1.01 (ref 1–1.03)
UROBILINOGEN UR QL STRIP: NORMAL
WBC NRBC COR # BLD: 7.04 10*3/MM3 (ref 4.8–10.8)

## 2019-07-06 PROCEDURE — 80053 COMPREHEN METABOLIC PANEL: CPT | Performed by: NURSE PRACTITIONER

## 2019-07-06 PROCEDURE — 81003 URINALYSIS AUTO W/O SCOPE: CPT | Performed by: NURSE PRACTITIONER

## 2019-07-06 PROCEDURE — 74019 RADEX ABDOMEN 2 VIEWS: CPT

## 2019-07-06 PROCEDURE — 83690 ASSAY OF LIPASE: CPT | Performed by: NURSE PRACTITIONER

## 2019-07-06 PROCEDURE — 99283 EMERGENCY DEPT VISIT LOW MDM: CPT

## 2019-07-06 PROCEDURE — 96360 HYDRATION IV INFUSION INIT: CPT

## 2019-07-06 PROCEDURE — 85025 COMPLETE CBC W/AUTO DIFF WBC: CPT | Performed by: NURSE PRACTITIONER

## 2019-07-06 RX ORDER — INDOMETHACIN 50 MG/1
50 CAPSULE ORAL
Qty: 30 CAPSULE | Refills: 0 | Status: SHIPPED | OUTPATIENT
Start: 2019-07-06 | End: 2020-11-01

## 2019-07-06 RX ORDER — MAGNESIUM CARB/ALUMINUM HYDROX 105-160MG
300 TABLET,CHEWABLE ORAL ONCE
Status: COMPLETED | OUTPATIENT
Start: 2019-07-06 | End: 2019-07-06

## 2019-07-06 RX ADMIN — SODIUM CHLORIDE 500 ML: 9 INJECTION, SOLUTION INTRAVENOUS at 14:12

## 2019-07-06 RX ADMIN — Medication 300 ML: at 14:57

## 2019-07-06 NOTE — ED PROVIDER NOTES
Subjective   Patient is a 33 year old female that presents to ED with abdominal pain and nausea. Patient states that in April she was admitted for SBO and patient states that these were her starting symptoms then. Patient states that she woke up early this morning with feelings of nausea and noticed that her abdomen was bloated. Patient states that she has not had a bowel movement in 8 days. Patient also states that she is unable to pass air at this time. Patient reports that she has tried otc laxatives without results. Patient reports that she has not vomited and denies fever at this time.             Review of Systems   Constitutional: Negative for fatigue and fever.   HENT: Negative for congestion.    Respiratory: Negative for cough and shortness of breath.    Cardiovascular: Negative for chest pain.   Gastrointestinal: Positive for abdominal pain and nausea. Negative for constipation, diarrhea and vomiting.   Genitourinary: Negative for dysuria, frequency and urgency.   Musculoskeletal: Negative for back pain and joint swelling.   Skin: Negative for color change, pallor and rash.   Neurological: Negative for dizziness, weakness and headaches.   All other systems reviewed and are negative.      Past Medical History:   Diagnosis Date   • GERD (gastroesophageal reflux disease)    • Small bowel obstruction (CMS/HCC)    • Substance abuse (CMS/[facility])        Allergies   Allergen Reactions   • Ceclor [Cefaclor] Unknown (See Comments)     Childhood reaction, states she can take amoxicillin       Past Surgical History:   Procedure Laterality Date   • ANKLE SURGERY     • EAR TUBES     • HERNIA REPAIR     • INNER EAR SURGERY     • TUBAL ABDOMINAL LIGATION         Family History   Problem Relation Age of Onset   • No Known Problems Mother    • Hyperlipidemia Father    • No Known Problems Sister    • No Known Problems Brother    • No Known Problems Daughter    • No Known Problems Paternal Grandmother    • Heart attack Paternal  Grandfather    • No Known Problems Sister        Social History     Socioeconomic History   • Marital status:      Spouse name: Not on file   • Number of children: Not on file   • Years of education: Not on file   • Highest education level: Not on file   Tobacco Use   • Smoking status: Former Smoker     Years: 0.00     Types: Electronic Cigarette, Cigarettes   • Smokeless tobacco: Never Used   Substance and Sexual Activity   • Alcohol use: No   • Drug use: No   • Sexual activity: Defer     Partners: Male     Birth control/protection: None           Objective   Physical Exam   Constitutional: She is oriented to person, place, and time. She appears well-developed and well-nourished.   HENT:   Head: Normocephalic and atraumatic.   Right Ear: External ear normal.   Left Ear: External ear normal.   Nose: Nose normal.   Mouth/Throat: Oropharynx is clear and moist.   Eyes: Pupils are equal, round, and reactive to light.   Neck: Normal range of motion. Neck supple.   Cardiovascular: Normal rate, regular rhythm, normal heart sounds and intact distal pulses.   Pulmonary/Chest: Effort normal and breath sounds normal.   Abdominal: Soft. Normal appearance and bowel sounds are normal. She exhibits distension. There is tenderness in the right upper quadrant, epigastric area and left upper quadrant. There is no guarding.   Musculoskeletal: Normal range of motion.   Neurological: She is alert and oriented to person, place, and time.   Skin: Skin is warm and dry. Capillary refill takes less than 2 seconds.   Psychiatric: She has a normal mood and affect.   Nursing note and vitals reviewed.      Procedures           ED Course  ED Course as of Jul 21 0853   Sat Jul 06, 2019   1431 Discussed xray results with patient. Patient opted to have a fleets enema here and to be discharged home with a bottle of magnesium citrate. Patient states that she is concerned that she will drive an hour home and not be able to have a BM. Discussed  with Dr. Sandy the plan of care of patient and he agrees to discharge patient home after enema  [CM]   8131 Report given to RAMYA Mahan and patient updated on provider switching.    [CM]      ED Course User Index  [CM] Martha Reid, APRN                  MDM  Number of Diagnoses or Management Options  Constipation, unspecified constipation type: established and worsening     Amount and/or Complexity of Data Reviewed  Clinical lab tests: ordered and reviewed  Tests in the radiology section of CPT®: ordered and reviewed  Discussion of test results with the performing providers: yes    Patient Progress  Patient progress: stable        Final diagnoses:   Constipation, unspecified constipation type            Martha Reid, APRN  07/21/19 0893

## 2019-07-06 NOTE — TELEPHONE ENCOUNTER
"    Reason for Disposition  • [1] SEVERE pain (e.g., excruciating) AND [2] present > 1 hour    Additional Information  • Negative: Shock suspected (e.g., cold/pale/clammy skin, too weak to stand, low BP, rapid pulse)  • Negative: Difficult to awaken or acting confused (e.g., disoriented, slurred speech)  • Negative: Passed out (i.e., lost consciousness, collapsed and was not responding)  • Negative: Sounds like a life-threatening emergency to the triager  • Negative: Chest pain  • Negative: Pain is mainly in upper abdomen  (if needed ask: \"is it mainly above the belly button?\")  • Negative: Followed an abdomen (stomach) injury  • Negative: [1] Abdominal pain AND [2] pregnant < 20 weeks  • Negative: [1] Abdominal pain AND [2] pregnant > 20 weeks  • Negative: [1] Abdominal pain AND [2] postpartum < 1 month since delivery    Answer Assessment - Initial Assessment Questions  1. LOCATION: \"Where does it hurt?\"       Abdomen   2. RADIATION: \"Does the pain shoot anywhere else?\" (e.g., chest, back)      No   3. ONSET: \"When did the pain begin?\" (e.g., minutes, hours or days ago)       Last couple of days   4. SUDDEN: \"Gradual or sudden onset?\"      Gradual   5. PATTERN \"Does the pain come and go, or is it constant?\"     - If constant: \"Is it getting better, staying the same, or worsening?\"       (Note: Constant means the pain never goes away completely; most serious pain is constant and it progresses)      - If intermittent: \"How long does it last?\" \"Do you have pain now?\"      (Note: Intermittent means the pain goes away completely between bouts)      Come and go. Worse with passing gas   6. SEVERITY: \"How bad is the pain?\"  (e.g., Scale 1-10; mild, moderate, or severe)    - MILD (1-3): doesn't interfere with normal activities, abdomen soft and not tender to touch     - MODERATE (4-7): interferes with normal activities or awakens from sleep, tender to touch     - SEVERE (8-10): excruciating pain, doubled over, unable to do " "any normal activities       Severe   7. RECURRENT SYMPTOM: \"Have you ever had this type of abdominal pain before?\" If so, ask: \"When was the last time?\" and \"What happened that time?\"       Yes; bowel obstruction a couple months ago  8. CAUSE: \"What do you think is causing the abdominal pain?\"      No BM in 7-8 days   9. RELIEVING/AGGRAVATING FACTORS: \"What makes it better or worse?\" (e.g., movement, antacids, bowel movement)      Nothing seems to help   10. OTHER SYMPTOMS: \"Has there been any vomiting, diarrhea, constipation, or urine problems?\"        Constipation; pain passing gas   11. PREGNANCY: \"Is there any chance you are pregnant?\" \"When was your last menstrual period?\"        No    Protocols used: ABDOMINAL PAIN - FEMALE-ADULT-AH      "

## 2019-07-06 NOTE — ED PROVIDER NOTES
Subjective   History of Present Illness    Review of Systems    Past Medical History:   Diagnosis Date   • GERD (gastroesophageal reflux disease)    • Small bowel obstruction (CMS/HCC)    • Substance abuse (CMS/HCC)        Allergies   Allergen Reactions   • Ceclor [Cefaclor] Unknown (See Comments)     Childhood reaction, states she can take amoxicillin       Past Surgical History:   Procedure Laterality Date   • ANKLE SURGERY     • EAR TUBES     • HERNIA REPAIR     • INNER EAR SURGERY     • TUBAL ABDOMINAL LIGATION         Family History   Problem Relation Age of Onset   • No Known Problems Mother    • Hyperlipidemia Father    • No Known Problems Sister    • No Known Problems Brother    • No Known Problems Daughter    • No Known Problems Paternal Grandmother    • Heart attack Paternal Grandfather    • No Known Problems Sister        Social History     Socioeconomic History   • Marital status:      Spouse name: Not on file   • Number of children: Not on file   • Years of education: Not on file   • Highest education level: Not on file   Tobacco Use   • Smoking status: Former Smoker     Years: 0.00     Types: Electronic Cigarette, Cigarettes   • Smokeless tobacco: Never Used   Substance and Sexual Activity   • Alcohol use: No   • Drug use: No   • Sexual activity: Defer     Partners: Male     Birth control/protection: None           Objective   Physical Exam    Procedures           ED Course  ED Course as of Jul 06 1535   Sat Jul 06, 2019   1431 Discussed xray results with patient. Patient opted to have a fleets enema here and to be discharged home with a bottle of magnesium citrate. Patient states that she is concerned that she will drive an hour home and not be able to have a BM. Discussed with Dr. Sandy the plan of care of patient and he agrees to discharge patient home after enema  [CM]   5209 Report given to RAMYA Mahan and patient updated on provider switching.    [CM]      ED Course User Index  [CM]  Martha Reid, HEENA                  [facility]  Number of Diagnoses or Management Options  Diagnosis management comments: BM here, see HEENA Rondon for HPI PE ROS note, patient feels improved, return precautions given        Amount and/or Complexity of Data Reviewed  Clinical lab tests: reviewed and ordered  Tests in the radiology section of CPT®: reviewed and ordered  Tests in the medicine section of CPT®: ordered and reviewed    Risk of Complications, Morbidity, and/or Mortality  Presenting problems: moderate  Diagnostic procedures: moderate  Management options: moderate    Patient Progress  Patient progress: stable        Final diagnoses:   Constipation, unspecified constipation type            Negrito Murdock PA-C  07/06/19 1535

## 2020-11-01 ENCOUNTER — HOSPITAL ENCOUNTER (OUTPATIENT)
Dept: GENERAL RADIOLOGY | Facility: HOSPITAL | Age: 35
Discharge: HOME | End: 2020-11-01

## 2020-11-01 PROCEDURE — 73590 X-RAY EXAM OF LOWER LEG: CPT

## 2020-11-01 PROCEDURE — 73610 X-RAY EXAM OF ANKLE: CPT

## 2020-11-01 PROCEDURE — 73562 X-RAY EXAM OF KNEE 3: CPT

## 2020-12-16 ENCOUNTER — APPOINTMENT (OUTPATIENT)
Dept: CT IMAGING | Facility: HOSPITAL | Age: 35
End: 2020-12-16

## 2020-12-16 ENCOUNTER — HOSPITAL ENCOUNTER (EMERGENCY)
Facility: HOSPITAL | Age: 35
Discharge: HOME | End: 2020-12-16
Attending: EMERGENCY MEDICINE | Admitting: EMERGENCY MEDICINE

## 2020-12-16 VITALS
DIASTOLIC BLOOD PRESSURE: 94 MMHG | HEART RATE: 84 BPM | SYSTOLIC BLOOD PRESSURE: 164 MMHG | BODY MASS INDEX: 35.36 KG/M2 | HEIGHT: 66 IN | TEMPERATURE: 98.8 F | WEIGHT: 220 LBS | OXYGEN SATURATION: 98 % | RESPIRATION RATE: 18 BRPM

## 2020-12-16 DIAGNOSIS — R10.84 GENERALIZED ABDOMINAL PAIN: Primary | ICD-10-CM

## 2020-12-16 DIAGNOSIS — K56.7 ILEUS (HCC): ICD-10-CM

## 2020-12-16 LAB
ALBUMIN SERPL-MCNC: 4.5 G/DL (ref 3.5–5.2)
ALBUMIN/GLOB SERPL: 2.1 G/DL
ALP SERPL-CCNC: 76 U/L (ref 39–117)
ALT SERPL W P-5'-P-CCNC: 11 U/L (ref 1–33)
ANION GAP SERPL CALCULATED.3IONS-SCNC: 7 MMOL/L (ref 5–15)
AST SERPL-CCNC: 12 U/L (ref 1–32)
BACTERIA UR QL AUTO: ABNORMAL /HPF
BASOPHILS # BLD AUTO: 0.05 10*3/MM3 (ref 0–0.2)
BASOPHILS NFR BLD AUTO: 0.6 % (ref 0–1.5)
BILIRUB SERPL-MCNC: 0.3 MG/DL (ref 0–1.2)
BILIRUB UR QL STRIP: NEGATIVE
BUN SERPL-MCNC: 9 MG/DL (ref 6–20)
BUN/CREAT SERPL: 14.3 (ref 7–25)
CALCIUM SPEC-SCNC: 9.3 MG/DL (ref 8.6–10.5)
CHLORIDE SERPL-SCNC: 105 MMOL/L (ref 98–107)
CLARITY UR: CLEAR
CO2 SERPL-SCNC: 26 MMOL/L (ref 22–29)
COLOR UR: YELLOW
CREAT SERPL-MCNC: 0.63 MG/DL (ref 0.57–1)
DEPRECATED RDW RBC AUTO: 39.1 FL (ref 37–54)
EOSINOPHIL # BLD AUTO: 0.1 10*3/MM3 (ref 0–0.4)
EOSINOPHIL NFR BLD AUTO: 1.1 % (ref 0.3–6.2)
ERYTHROCYTE [DISTWIDTH] IN BLOOD BY AUTOMATED COUNT: 12.9 % (ref 12.3–15.4)
GFR SERPL CREATININE-BSD FRML MDRD: 107 ML/MIN/1.73
GLOBULIN UR ELPH-MCNC: 2.1 GM/DL
GLUCOSE SERPL-MCNC: 104 MG/DL (ref 65–99)
GLUCOSE UR STRIP-MCNC: NEGATIVE MG/DL
HCG SERPL QL: NEGATIVE
HCT VFR BLD AUTO: 38.1 % (ref 34–46.6)
HGB BLD-MCNC: 12.9 G/DL (ref 12–15.9)
HGB UR QL STRIP.AUTO: NEGATIVE
HYALINE CASTS UR QL AUTO: ABNORMAL /LPF
IMM GRANULOCYTES # BLD AUTO: 0.03 10*3/MM3 (ref 0–0.05)
IMM GRANULOCYTES NFR BLD AUTO: 0.3 % (ref 0–0.5)
KETONES UR QL STRIP: NEGATIVE
LEUKOCYTE ESTERASE UR QL STRIP.AUTO: ABNORMAL
LIPASE SERPL-CCNC: 20 U/L (ref 13–60)
LYMPHOCYTES # BLD AUTO: 1.04 10*3/MM3 (ref 0.7–3.1)
LYMPHOCYTES NFR BLD AUTO: 11.9 % (ref 19.6–45.3)
MCH RBC QN AUTO: 28.3 PG (ref 26.6–33)
MCHC RBC AUTO-ENTMCNC: 33.9 G/DL (ref 31.5–35.7)
MCV RBC AUTO: 83.6 FL (ref 79–97)
MONOCYTES # BLD AUTO: 0.43 10*3/MM3 (ref 0.1–0.9)
MONOCYTES NFR BLD AUTO: 4.9 % (ref 5–12)
NEUTROPHILS NFR BLD AUTO: 7.07 10*3/MM3 (ref 1.7–7)
NEUTROPHILS NFR BLD AUTO: 81.2 % (ref 42.7–76)
NITRITE UR QL STRIP: NEGATIVE
NRBC BLD AUTO-RTO: 0 /100 WBC (ref 0–0.2)
PH UR STRIP.AUTO: 6.5 [PH] (ref 5–8)
PLATELET # BLD AUTO: 253 10*3/MM3 (ref 140–450)
PMV BLD AUTO: 9.9 FL (ref 6–12)
POTASSIUM SERPL-SCNC: 4 MMOL/L (ref 3.5–5.2)
PROT SERPL-MCNC: 6.6 G/DL (ref 6–8.5)
PROT UR QL STRIP: NEGATIVE
RBC # BLD AUTO: 4.56 10*6/MM3 (ref 3.77–5.28)
RBC # UR: ABNORMAL /HPF
REF LAB TEST METHOD: ABNORMAL
SODIUM SERPL-SCNC: 138 MMOL/L (ref 136–145)
SP GR UR STRIP: 1.01 (ref 1–1.03)
SQUAMOUS #/AREA URNS HPF: ABNORMAL /HPF
UROBILINOGEN UR QL STRIP: ABNORMAL
WBC # BLD AUTO: 8.72 10*3/MM3 (ref 3.4–10.8)
WBC UR QL AUTO: ABNORMAL /HPF

## 2020-12-16 PROCEDURE — 96376 TX/PRO/DX INJ SAME DRUG ADON: CPT

## 2020-12-16 PROCEDURE — 84703 CHORIONIC GONADOTROPIN ASSAY: CPT | Performed by: EMERGENCY MEDICINE

## 2020-12-16 PROCEDURE — 25010000002 IOPAMIDOL 61 % SOLUTION: Performed by: EMERGENCY MEDICINE

## 2020-12-16 PROCEDURE — 85025 COMPLETE CBC W/AUTO DIFF WBC: CPT | Performed by: EMERGENCY MEDICINE

## 2020-12-16 PROCEDURE — 81001 URINALYSIS AUTO W/SCOPE: CPT | Performed by: EMERGENCY MEDICINE

## 2020-12-16 PROCEDURE — 96374 THER/PROPH/DIAG INJ IV PUSH: CPT

## 2020-12-16 PROCEDURE — 25010000003 HYDROMORPHONE 1 MG/ML SOLUTION: Performed by: EMERGENCY MEDICINE

## 2020-12-16 PROCEDURE — 25010000002 ONDANSETRON PER 1 MG: Performed by: EMERGENCY MEDICINE

## 2020-12-16 PROCEDURE — 99283 EMERGENCY DEPT VISIT LOW MDM: CPT

## 2020-12-16 PROCEDURE — 80053 COMPREHEN METABOLIC PANEL: CPT | Performed by: EMERGENCY MEDICINE

## 2020-12-16 PROCEDURE — 96361 HYDRATE IV INFUSION ADD-ON: CPT

## 2020-12-16 PROCEDURE — 96375 TX/PRO/DX INJ NEW DRUG ADDON: CPT

## 2020-12-16 PROCEDURE — 74177 CT ABD & PELVIS W/CONTRAST: CPT

## 2020-12-16 PROCEDURE — 83690 ASSAY OF LIPASE: CPT | Performed by: EMERGENCY MEDICINE

## 2020-12-16 RX ORDER — ONDANSETRON 2 MG/ML
4 INJECTION INTRAMUSCULAR; INTRAVENOUS ONCE
Status: COMPLETED | OUTPATIENT
Start: 2020-12-16 | End: 2020-12-16

## 2020-12-16 RX ORDER — SODIUM CHLORIDE 9 MG/ML
125 INJECTION, SOLUTION INTRAVENOUS CONTINUOUS
Status: DISCONTINUED | OUTPATIENT
Start: 2020-12-16 | End: 2020-12-16 | Stop reason: HOSPADM

## 2020-12-16 RX ORDER — MAGNESIUM CARB/ALUMINUM HYDROX 105-160MG
300 TABLET,CHEWABLE ORAL ONCE
Status: COMPLETED | OUTPATIENT
Start: 2020-12-16 | End: 2020-12-16

## 2020-12-16 RX ORDER — SODIUM CHLORIDE 0.9 % (FLUSH) 0.9 %
10 SYRINGE (ML) INJECTION AS NEEDED
Status: DISCONTINUED | OUTPATIENT
Start: 2020-12-16 | End: 2020-12-16 | Stop reason: HOSPADM

## 2020-12-16 RX ADMIN — Medication 300 ML: at 16:12

## 2020-12-16 RX ADMIN — SODIUM CHLORIDE 125 ML/HR: 9 INJECTION, SOLUTION INTRAVENOUS at 12:51

## 2020-12-16 RX ADMIN — ONDANSETRON HYDROCHLORIDE 4 MG: 2 SOLUTION INTRAMUSCULAR; INTRAVENOUS at 12:49

## 2020-12-16 RX ADMIN — HYDROMORPHONE HYDROCHLORIDE 1 MG: 1 INJECTION, SOLUTION INTRAMUSCULAR; INTRAVENOUS; SUBCUTANEOUS at 14:28

## 2020-12-16 RX ADMIN — HYDROMORPHONE HYDROCHLORIDE 1 MG: 1 INJECTION, SOLUTION INTRAMUSCULAR; INTRAVENOUS; SUBCUTANEOUS at 12:50

## 2020-12-16 RX ADMIN — IOPAMIDOL 100 ML: 612 INJECTION, SOLUTION INTRAVENOUS at 14:20

## 2020-12-16 RX ADMIN — HYDROMORPHONE HYDROCHLORIDE 1 MG: 1 INJECTION, SOLUTION INTRAMUSCULAR; INTRAVENOUS; SUBCUTANEOUS at 16:12

## 2020-12-16 RX ADMIN — ONDANSETRON HYDROCHLORIDE 4 MG: 2 SOLUTION INTRAMUSCULAR; INTRAVENOUS at 14:28

## 2020-12-16 NOTE — ED PROVIDER NOTES
Subjective   Patient complains of generalized abdominal pain.  She says started about 4 AM this morning and is progressively worsened during the day despite taking Pepto-Bismol and other antacids.  She did manage to have 1 small bowel movement but is very small.  She has had some nausea but no actual vomiting.  The pain has increased.  It seems to start in the lower abdomen move up to the upper abdomen.  It comes in waves.  She saw her regular physician who said he could not hear bowel sounds and told her to come here to get checked out because she does have a history of obstruction in the past.  She also says her abdomen is more protuberant than usual.      History provided by:  Patient   used: No    Abdominal Pain  Pain location:  Generalized  Pain quality: aching    Pain radiates to:  Does not radiate  Pain severity:  Severe  Onset quality:  Gradual  Duration:  8 hours  Timing:  Constant  Progression:  Worsening  Chronicity:  New  Context: not alcohol use, not diet changes, not laxative use, not medication withdrawal, not previous surgeries, not recent illness, not recent sexual activity, not retching, not sick contacts and not suspicious food intake    Relieved by:  Nothing  Worsened by:  Nothing  Ineffective treatments:  Antacids  Associated symptoms: belching, constipation and nausea    Associated symptoms: no anorexia, no chills, no dysuria, no fever, no hematemesis, no hematuria, no shortness of breath, no vaginal discharge and no vomiting    Nausea:     Severity:  Severe    Onset quality:  Gradual    Duration:  8 hours    Timing:  Constant    Progression:  Worsening  Risk factors: no alcohol abuse, no aspirin use, not elderly, has not had multiple surgeries, no NSAID use, not obese, not pregnant and no recent hospitalization        Review of Systems   Constitutional: Negative.  Negative for chills and fever.   Respiratory: Negative.  Negative for shortness of breath.    Cardiovascular:  Negative.    Gastrointestinal: Positive for abdominal pain, constipation and nausea. Negative for anorexia, hematemesis and vomiting.   Genitourinary: Negative.  Negative for dysuria, hematuria and vaginal discharge.   Musculoskeletal: Negative.    Skin: Negative.    Neurological: Negative.    Psychiatric/Behavioral: Negative.    All other systems reviewed and are negative.      Past Medical History:   Diagnosis Date   • GERD (gastroesophageal reflux disease)    • Small bowel obstruction (CMS/HCC)    • Substance abuse (CMS/HCC)        Allergies   Allergen Reactions   • Ceclor [Cefaclor] Unknown (See Comments)     Childhood reaction, states she can take amoxicillin       Past Surgical History:   Procedure Laterality Date   • ANKLE SURGERY     • EAR TUBES     • HERNIA REPAIR     • INNER EAR SURGERY     • TUBAL ABDOMINAL LIGATION         Family History   Problem Relation Age of Onset   • No Known Problems Mother    • Hyperlipidemia Father    • No Known Problems Sister    • No Known Problems Brother    • No Known Problems Daughter    • No Known Problems Paternal Grandmother    • Heart attack Paternal Grandfather    • No Known Problems Sister        Social History     Socioeconomic History   • Marital status:      Spouse name: Not on file   • Number of children: Not on file   • Years of education: Not on file   • Highest education level: Not on file   Tobacco Use   • Smoking status: Current Some Day Smoker     Years: 0.00     Types: Electronic Cigarette, Cigarettes   • Smokeless tobacco: Never Used   Substance and Sexual Activity   • Alcohol use: Yes     Comment: occasional   • Drug use: No   • Sexual activity: Defer     Partners: Male     Birth control/protection: None       Prior to Admission medications    Medication Sig Start Date End Date Taking? Authorizing Provider   atorvastatin (LIPITOR) 80 MG tablet Take 80 mg by mouth Daily.    Emergency, Nurse Bri, RN   ibuprofen (ADVIL,MOTRIN) 800 MG tablet Take 1  tablet by mouth Every 8 (Eight) Hours As Needed for Mild Pain  or Moderate Pain . 5/28/19   Reid Ayala MD   lisinopril (PRINIVIL,ZESTRIL) 20 MG tablet Take 20 mg by mouth Daily. 8/6/20   Emergency, Nurse Bri, RN   loratadine (CLARITIN) 10 MG tablet Take 10 mg by mouth Daily.    ProviderPadmini MD   omeprazole (priLOSEC) 20 MG capsule Take 20 mg by mouth Daily.    Provider, MD Padmini       Medications   sodium chloride 0.9 % flush 10 mL (has no administration in time range)   sodium chloride 0.9 % infusion (125 mL/hr Intravenous New Bag 12/16/20 1251)   magnesium citrate solution 300 mL (has no administration in time range)   HYDROmorphone (DILAUDID) injection 1 mg (has no administration in time range)   HYDROmorphone (DILAUDID) injection 1 mg (1 mg Intravenous Given 12/16/20 1250)   ondansetron (ZOFRAN) injection 4 mg (4 mg Intravenous Given 12/16/20 1249)   HYDROmorphone (DILAUDID) injection 1 mg (1 mg Intravenous Given 12/16/20 1428)   ondansetron (ZOFRAN) injection 4 mg (4 mg Intravenous Given 12/16/20 1428)   iopamidol (ISOVUE-300) 61 % injection 100 mL (100 mL Intravenous Given 12/16/20 1420)       Vitals:    12/16/20 1213   BP: (!) 174/104   Pulse: 79   Resp: 22   Temp: 98.8 °F (37.1 °C)   SpO2: 100%         Objective   Physical Exam  Vitals signs and nursing note reviewed.   Constitutional:       Appearance: She is well-developed.   HENT:      Head: Normocephalic and atraumatic.   Cardiovascular:      Rate and Rhythm: Normal rate and regular rhythm.   Pulmonary:      Effort: Pulmonary effort is normal.      Breath sounds: Normal breath sounds.   Abdominal:      General: Abdomen is protuberant. Bowel sounds are decreased.      Palpations: Abdomen is soft.      Tenderness: There is generalized abdominal tenderness.      Comments: Bowel sounds to me or decreased but have occasional rushes.   Skin:     General: Skin is warm and dry.   Neurological:      General: No focal deficit present.       Mental Status: She is alert and oriented to person, place, and time.   Psychiatric:         Mood and Affect: Mood normal.         Behavior: Behavior normal.         Procedures         Lab Results (last 24 hours)     Procedure Component Value Units Date/Time    CBC & Differential [[id number]]  (Abnormal) Collected: 12/16/20 1245    Specimen: Blood from Arm, Right Updated: 12/16/20 1259    Narrative:      The following orders were created for panel order CBC & Differential.  Procedure                               Abnormality         Status                     ---------                               -----------         ------                     CBC Auto Differential[[id number]]        Abnormal            Final result                 Please view results for these tests on the individual orders.    Comprehensive Metabolic Panel [[id number]]  (Abnormal) Collected: 12/16/20 1245    Specimen: Blood from Arm, Right Updated: 12/16/20 1317     Glucose 104 mg/dL      BUN 9 mg/dL      Creatinine 0.63 mg/dL      Sodium 138 mmol/L      Potassium 4.0 mmol/L      Chloride 105 mmol/L      CO2 26.0 mmol/L      Calcium 9.3 mg/dL      Total Protein 6.6 g/dL      Albumin 4.50 g/dL      ALT (SGPT) 11 U/L      AST (SGOT) 12 U/L      Alkaline Phosphatase 76 U/L      Total Bilirubin 0.3 mg/dL      eGFR Non African Amer 107 mL/min/1.73      Globulin 2.1 gm/dL      A/G Ratio 2.1 g/dL      BUN/Creatinine Ratio 14.3     Anion Gap 7.0 mmol/L     Narrative:      GFR Normal >60  Chronic Kidney Disease <60  Kidney Failure <15      Lipase [[id number]]  (Normal) Collected: 12/16/20 1245    Specimen: Blood from Arm, Right Updated: 12/16/20 1317     Lipase 20 U/L     hCG, Serum, Qualitative [[id number]]  (Normal) Collected: 12/16/20 1245    Specimen: Blood from Arm, Right Updated: 12/16/20 1311     HCG Qualitative Negative    CBC Auto Differential [[id number]]  (Abnormal) Collected: 12/16/20 1245    Specimen: Blood from Arm, Right Updated: 12/16/20 1259      WBC 8.72 10*3/mm3      RBC 4.56 10*6/mm3      Hemoglobin 12.9 g/dL      Hematocrit 38.1 %      MCV 83.6 fL      MCH 28.3 pg      MCHC 33.9 g/dL      RDW 12.9 %      RDW-SD 39.1 fl      MPV 9.9 fL      Platelets 253 10*3/mm3      Neutrophil % 81.2 %      Lymphocyte % 11.9 %      Monocyte % 4.9 %      Eosinophil % 1.1 %      Basophil % 0.6 %      Immature Grans % 0.3 %      Neutrophils, Absolute 7.07 10*3/mm3      Lymphocytes, Absolute 1.04 10*3/mm3      Monocytes, Absolute 0.43 10*3/mm3      Eosinophils, Absolute 0.10 10*3/mm3      Basophils, Absolute 0.05 10*3/mm3      Immature Grans, Absolute 0.03 10*3/mm3      nRBC 0.0 /100 WBC     Urinalysis With Culture If Indicated - Urine, Clean Catch [[id number]]  (Abnormal) Collected: 12/16/20 1249    Specimen: Urine, Clean Catch Updated: 12/16/20 1303     Color, UA Yellow     Appearance, UA Clear     pH, UA 6.5     Specific Gravity, UA 1.011     Glucose, UA Negative     Ketones, UA Negative     Bilirubin, UA Negative     Blood, UA Negative     Protein, UA Negative     Leuk Esterase, UA Trace     Nitrite, UA Negative     Urobilinogen, UA 0.2 E.U./dL    Urinalysis, Microscopic Only - Urine, Clean Catch [[id number]]  (Abnormal) Collected: 12/16/20 1249    Specimen: Urine, Clean Catch Updated: 12/16/20 1303     RBC, UA 0-2 /HPF      WBC, UA 0-2 /HPF      Bacteria, UA 2+ /HPF      Squamous Epithelial Cells, UA 3-6 /HPF      Hyaline Casts, UA 3-6 /LPF      Methodology Automated Microscopy          CT Abdomen Pelvis With Contrast   Final Result          ED Course  ED Course as of Dec 16 1531   Wed Dec 16, 2020   1530 I told the patient of the results of her work-up here.  Only thing they found was a lot of gas in her colon and suspected it was an ileus but no definite obstruction.  I told her I did not think an NG tube would help and may just need to have something to help this pass on through.  Narcotics would also be not helpful because it may make any ileus worse.  At her request  I did speak with Dr. Fraga of surgery.  His recommendation was to give her something for the nausea and let her go home and see if it would pass and follow-up with him tomorrow if he gets worse.  She understands this and agrees.    [TR]      ED Course User Index  [TR] Arnulfo Sandy Jr., MD          MDM  Number of Diagnoses or Management Options  Generalized abdominal pain: new and requires workup  Ileus (CMS/HCC): new and requires workup     Amount and/or Complexity of Data Reviewed  Clinical lab tests: ordered and reviewed  Tests in the radiology section of CPT®: ordered and reviewed    Risk of Complications, Morbidity, and/or Mortality  Presenting problems: moderate  Diagnostic procedures: moderate  Management options: moderate    Patient Progress  Patient progress: stable      Final diagnoses:   Generalized abdominal pain   Ileus (CMS/HCC)          Arnulfo Sandy Jr., MD  12/16/20 1536

## 2021-01-18 NOTE — PROGRESS NOTES
"Chief Complaint:   Chief Complaint   Patient presents with   • Abdominal Pain     Pt was having RUQ pain about a month ago-ended up [facility] ER-had labs/CT-states she had one episode of pain since then but none lately; Pt states she does have a lot of gas often-states it happens 40-50x/day   • Heartburn     Pt does c/o frequent reflux   • Alternating bowel habits     Pt states she is either constipated or has diarrhea-has been a chronic issue         Patient ID: Kalyn Tinsley is a 35 y.o. female     History of Present Illness: This is a very pleasant 35-year-old female who is here today with complaints of abdominal pain, heartburn and alternating bowel habits.    The patient was seen at [facility] ER on 12/16/2020 with complaints of generalized abdominal pain along with nausea.  The patient states that she began to have right upper quadrant abdominal pain that radiated downward feeling \"like contractions\".she states she has had persistent heartburn and alternating bowel habits either constipation or diarrhea.  She states that the bowel habits have been like this since she was a teenager.  She states she also has \"lots of passing of gas all day long.\"  She is taking Prilosec 20 mg once a day and sometimes twice a day.  She states she has been taking this for approximately 11 years.  She denies any chronic NSAID use.  CMP was unremarkable.  Lipase within normal limits.  CBC unremarkable.    The patient does carry a history of small bowel obstruction.  She states there is no known family history of colon cancer however her paternal grandmother and her father had colon polyps.The patient denies any nausea, vomiting, epigastric pain, dysphagia,  or hematemesis.  The patient denies any fever or chills.  Denies any melena or hematochezia.  Denies any unintentional weight loss or loss of appetite.    Study Result    EXAMINATION: CT ABDOMEN PELVIS W CONTRAST-      12/16/2020 2:15 PM CST     HISTORY: Abdominal pain, acute, " nonlocalized   Summary:  1. Air-filled colon from the cecum to the mid sigmoid region. No  obstructing mass or wall thickening is seen. Ileus suspected though  etiology is uncertain.  2. There is no mass, fluid collection, or inflammatory process.      Past Medical History:   Diagnosis Date   • Family history of colonic polyps    • GERD (gastroesophageal reflux disease)    • Small bowel obstruction (CMS/HCC)    • Substance abuse (CMS/HCC)    • Thyrotoxicosis        Past Surgical History:   Procedure Laterality Date   • ANKLE SURGERY     • EAR TUBES     • HERNIA REPAIR     • INNER EAR SURGERY     • TUBAL ABDOMINAL LIGATION           Current Outpatient Medications:   •  cholecalciferol (VITAMIN D3) 25 MCG (1000 UT) tablet, Take 3,000 Units by mouth Daily., Disp: , Rfl:   •  ibuprofen (ADVIL,MOTRIN) 800 MG tablet, Take 1 tablet by mouth Every 8 (Eight) Hours As Needed for Mild Pain  or Moderate Pain . (Patient taking differently: Take 800 mg by mouth As Needed for Mild Pain  or Moderate Pain .), Disp: 30 tablet, Rfl: 0  •  loratadine (CLARITIN) 10 MG tablet, Take 10 mg by mouth As Needed., Disp: , Rfl:   •  Melatonin 5 MG capsule, Take 1 each by mouth As Needed., Disp: , Rfl:   •  omeprazole (priLOSEC) 20 MG capsule, Take 20 mg by mouth Daily. 1/19/2021-Pt states some days she takes BID-depends on what she eats, Disp: , Rfl:   •  Pediatric Multiple Vitamins (MULTIVITAMIN CHILDRENS PO), Take 1 capsule by mouth Daily., Disp: , Rfl:   •  sodium-potassium-magnesium sulfates (Suprep Bowel Prep Kit) 17.5-3.13-1.6 GM/177ML solution oral solution, Take 1 bottle by mouth Every 12 (Twelve) Hours. Split dose prep as directed by office instructions provided.  2 bottles = one kit., Disp: 2 bottle, Rfl: 0    Allergies   Allergen Reactions   • Ceclor [Cefaclor] Rash     Childhood reaction-states she can take amoxicillin       Social History     Socioeconomic History   • Marital status:      Spouse name: Not on file   • Number  "of children: Not on file   • Years of education: Not on file   • Highest education level: Not on file   Tobacco Use   • Smoking status: Former Smoker     Years: 0.00     Types: Electronic Cigarette, Cigarettes   • Smokeless tobacco: Never Used   • Tobacco comment: 1/19/2021-Pt does \"occasionally' use a vape   Substance and Sexual Activity   • Alcohol use: Yes     Comment: Socially    • Drug use: No   • Sexual activity: Defer     Partners: Male     Birth control/protection: None       Family History   Problem Relation Age of Onset   • No Known Problems Mother    • Hyperlipidemia Father    • No Known Problems Sister    • No Known Problems Brother    • No Known Problems Daughter    • Colon polyps Paternal Grandmother         > 60 years of age    • Esophageal cancer Paternal Grandmother         Per pt-had precancerous cells   • Heart attack Paternal Grandfather    • No Known Problems Sister    • Liver cancer Niece         Hepatoblastoma   • Colon cancer Neg Hx    • Liver disease Neg Hx    • Rectal cancer Neg Hx    • Stomach cancer Neg Hx        Vitals:    01/19/21 1444   BP: 136/84   BP Location: Left arm   Patient Position: Sitting   Cuff Size: Adult   Pulse: 92   Temp: 98 °F (36.7 °C)   TempSrc: Infrared   SpO2: 99%   Weight: 98 kg (216 lb)   Height: 165.1 cm (65\")       Review of Systems:    General:    Present -feeling well   Skin:    Not Present-Rash   HEENT:     Not Present-Acute visual changes or Acute hearing changes   Neck :    Not Present- swollen glands   Genitourinary:      Not Present- burning, frequency, urgency hematuria, dysuria,   Cardiovascular:   Not Present-chest pain, palpitations, or pressure   Respiratory:   Not Present- shortness of breath or cough   Gastrointestinal:  Musculoskeletal:  Neurological:  Psychiatric:   Present as mentioned in the HP    Not Present. Recent gait disturbances.    Not Present-Seizures and weakness in extremities.    Not Present- Anxiety or Depression.       Physical " "Exam:    General Appearance:    Alert, cooperative, in no acute distress   Psych:    Mood appropriate    Eyes:          conjunctivae and sclerae normal, no   icterus, no pallor   ENMT:    Ears appear intact with no abnormalities noted oral mucosa moist   Neck:   No adenopathy, supple, trachea midline, no thyromegaly, no   carotid bruit, no JVD    Cardiovascular:    Regular rhythm and normal rate, normal S1 and S2, no            murmur, no gallop, no rub, no click   Gastrointestinal:     Inspection normal.  Normal bowel sounds, no masses, no organomegaly, soft round non-tender, non-distended, no guarding, no rebound or tenderness. No hepatosplenomegaly.   Skin:   No bleeding, bruising or rash   Neurologic:   nonfocal       Lab Results - Last 18 Months   Lab Units 12/16/20  1245   GLUCOSE mg/dL 104*   BUN mg/dL 9   CREATININE mg/dL 0.63   SODIUM mmol/L 138   POTASSIUM mmol/L 4.0   CHLORIDE mmol/L 105   CO2 mmol/L 26.0   TOTAL PROTEIN g/dL 6.6   ALBUMIN g/dL 4.50   ALT (SGPT) U/L 11   AST (SGOT) U/L 12   ALK PHOS U/L 76   BILIRUBIN mg/dL 0.3   GLOBULIN gm/dL 2.1       Lab Results - Last 18 Months   Lab Units 12/16/20  1245   HEMOGLOBIN g/dL 12.9   HEMATOCRIT % 38.1   MCV fL 83.6   WBC 10*3/mm3 8.72   RDW % 12.9   MPV fL 9.9   PLATELETS 10*3/mm3 253       Body mass index is 35.94 kg/m². Patient's Body mass index is 35.94 kg/m². BMI is above normal parameters. Recommendations include: nutrition counseling.    Assessment and Plan:  Assessment/Plan   Diagnoses and all orders for this visit:    1. Heartburn (Primary)  -     Case Request; Standing  -     Case Request    2. Gastroesophageal reflux disease, unspecified whether esophagitis present  -     Case Request; Standing  -     Case Request    3. Bloating  -     Case Request; Standing  -     Case Request    4. Change in bowel habits  Comments:  both constipation and or diarrhea \"since I was a teenager.\"  Orders:  -     Case Request; Standing  -     Case Request    5. " Flatulence  -     Case Request; Standing  -     Case Request    6. Right upper quadrant abdominal pain    Other orders  -     Follow Anesthesia Guidelines / Protocol; Future  -     Obtain Informed Consent; Future  -     Implement Anesthesia Orders Day of Procedure; Standing  -     Obtain Informed Consent; Standing  -     Verify bowel prep was successful; Standing  -     sodium-potassium-magnesium sulfates (Suprep Bowel Prep Kit) 17.5-3.13-1.6 GM/177ML solution oral solution; Take 1 bottle by mouth Every 12 (Twelve) Hours. Split dose prep as directed by office instructions provided.  2 bottles = one kit.  Dispense: 2 bottle; Refill: 0      Schedule patient for both EGD and colonoscopy.  Continue on PPI at this time.      There are no Patient Instructions on file for this visit.    Next follow-up appointment    The risks, benefits, and alternatives of colonoscopy were reviewed with the patient today.  Risks including perforation of the colon possibly requiring surgery or colostomy.  Additional risks include risk of bleeding from biopsies or removal of colon tissue.  There is also the risk of a drug reaction or problems with anesthesia.  This will be discussed with the further by the anesthesia team on the day of the procedure.  Lastly there is a possibility of missing a colon polyp or cancer.  The benefits include the diagnosis and management of disease of the colon and rectum.  Alternatives to colonoscopy include barium enema, laboratory testing, radiographic evaluation, or no intervention.  The patient verbalizes understanding and agrees.    The risks, benefits, and alternatives of endoscopy were reviewed with the patient today.  Risks including perforation, with or without dilation, possibly requiring surgery.  Additional risks include risk of bleeding.  There is also the risk of a drug reaction or problems with anesthesia.  This will be discussed with the further by the anesthesia team on the day of the procedure.  The benefits include the diagnosis and management of disease of the upper digestive tract.  Alternatives to endoscopy include upper GI series, laboratory testing, radiographic evaluation, or no intervention.  The patient verbalizes understanding and agrees.      EMR Dragon/Transcription disclaimer:  Much of this encounter note is an electronic transcription/translation of spoken language to printed text. The electronic translation of spoken language may permit erroneous, or at times, nonsensical words or phrases to be inadvertently transcribed; although I have reviewed the note for such errors, some may still exist.

## 2021-01-19 ENCOUNTER — OFFICE VISIT (OUTPATIENT)
Dept: GASTROENTEROLOGY | Facility: CLINIC | Age: 36
End: 2021-01-19

## 2021-01-19 VITALS
HEIGHT: 65 IN | DIASTOLIC BLOOD PRESSURE: 84 MMHG | SYSTOLIC BLOOD PRESSURE: 136 MMHG | HEART RATE: 92 BPM | TEMPERATURE: 98 F | BODY MASS INDEX: 35.99 KG/M2 | OXYGEN SATURATION: 99 % | WEIGHT: 216 LBS

## 2021-01-19 DIAGNOSIS — R14.0 BLOATING: ICD-10-CM

## 2021-01-19 DIAGNOSIS — R19.4 CHANGE IN BOWEL HABITS: ICD-10-CM

## 2021-01-19 DIAGNOSIS — K21.9 GASTROESOPHAGEAL REFLUX DISEASE, UNSPECIFIED WHETHER ESOPHAGITIS PRESENT: ICD-10-CM

## 2021-01-19 DIAGNOSIS — R10.11 RIGHT UPPER QUADRANT ABDOMINAL PAIN: ICD-10-CM

## 2021-01-19 DIAGNOSIS — R12 HEARTBURN: Primary | ICD-10-CM

## 2021-01-19 DIAGNOSIS — R14.3 FLATULENCE: ICD-10-CM

## 2021-01-19 PROCEDURE — 99214 OFFICE O/P EST MOD 30 MIN: CPT | Performed by: NURSE PRACTITIONER

## 2021-01-19 RX ORDER — MELATONIN
3000 DAILY
COMMUNITY

## 2021-01-19 RX ORDER — SODIUM, POTASSIUM,MAG SULFATES 17.5-3.13G
1 SOLUTION, RECONSTITUTED, ORAL ORAL EVERY 12 HOURS
Qty: 2 BOTTLE | Refills: 0 | Status: SHIPPED | OUTPATIENT
Start: 2021-01-19 | End: 2022-01-05

## 2021-02-01 ENCOUNTER — TELEPHONE (OUTPATIENT)
Dept: GASTROENTEROLOGY | Facility: CLINIC | Age: 36
End: 2021-02-01

## 2021-02-01 NOTE — TELEPHONE ENCOUNTER
FYI:  When seen in office, you ordered EGD/Colonoscopy and pt wanted to talk to her work and call back to schedule. I called pt today because I had not heard from her and she states that she does not want to schedule at this time because she does not want to take off work.

## 2022-01-05 ENCOUNTER — OFFICE VISIT (OUTPATIENT)
Dept: GASTROENTEROLOGY | Facility: CLINIC | Age: 37
End: 2022-01-05

## 2022-01-05 VITALS
HEIGHT: 65 IN | WEIGHT: 209 LBS | SYSTOLIC BLOOD PRESSURE: 122 MMHG | TEMPERATURE: 97.8 F | HEART RATE: 93 BPM | DIASTOLIC BLOOD PRESSURE: 78 MMHG | BODY MASS INDEX: 34.82 KG/M2 | OXYGEN SATURATION: 98 %

## 2022-01-05 DIAGNOSIS — Z01.818 PREOPERATIVE TESTING: Primary | ICD-10-CM

## 2022-01-05 DIAGNOSIS — R10.13 EPIGASTRIC PAIN: ICD-10-CM

## 2022-01-05 DIAGNOSIS — R12 HEARTBURN: ICD-10-CM

## 2022-01-05 DIAGNOSIS — K21.9 GASTROESOPHAGEAL REFLUX DISEASE, UNSPECIFIED WHETHER ESOPHAGITIS PRESENT: ICD-10-CM

## 2022-01-05 DIAGNOSIS — K59.09 OTHER CONSTIPATION: Primary | ICD-10-CM

## 2022-01-05 DIAGNOSIS — Z83.71 FAMILY HISTORY OF POLYPS IN THE COLON: ICD-10-CM

## 2022-01-05 PROCEDURE — 99214 OFFICE O/P EST MOD 30 MIN: CPT | Performed by: NURSE PRACTITIONER

## 2022-01-05 RX ORDER — SODIUM, POTASSIUM,MAG SULFATES 17.5-3.13G
1 SOLUTION, RECONSTITUTED, ORAL ORAL EVERY 12 HOURS
Qty: 177 ML | Refills: 0 | Status: SHIPPED | OUTPATIENT
Start: 2022-01-05

## 2022-01-05 RX ORDER — SEMAGLUTIDE 1 MG/.5ML
0.5 INJECTION, SOLUTION SUBCUTANEOUS WEEKLY
COMMUNITY
Start: 2021-11-24

## 2022-01-05 NOTE — PROGRESS NOTES
"Chief Complaint:   Chief Complaint   Patient presents with   • Abdominal Pain     Pt c/o upper abdominal pain that \"comes and goes\" for the last 6 weeks-seems worse after eating; Pt had CT at [facility] 12/19/21-reports she was told she had lots of inflammation and her \"bowels looked asleep\"    • Constipation     Pt also c/o issues with constipation for the last 2-3 months-pt states she can't have a BM unless she uses an enema-has tried laxatives but that made her have extreme cramping and she still didn't have a BM          Patient ID: Kalyn Tinsley is a 36 y.o. female     History of Present Illness: The patient is here today with complaints of upper abdominal pain along with constipation.    The patient was last seen in our office on 1/19/2021 with complaints of right upper quadrant abdominal pain, heartburn and alternating bowel habits.The patient had previously been seen at [facility] ER 2 years ago on 12/16/2020 for complaints of generalized abdominal pain.  She noted it started early in the morning and progressively worsened.  She tried to treat herself with Pepto-Bismol and other antacids.  She noted one small bowel movement.  She had some nausea but no vomiting.  CT of abdomen and pelvis as noted below.  CMP essentially unremarkable.  Lipase within normal limits.  CBC unremarkable.  Patient was informed that work-up was essentially unremarkable and that there was a lot of gas in her colon with suspected ileus but no definite obstruction.  Dr. Fraga with surgery was contacted by phone who recommended to give the patient something for nausea and to go home with follow-up with him in the a.m.  On last office visit orders were placed for both EGD and colonoscopy however patient canceled these.    Today the patient states that she has been having upper abdominal pain \"that comes and goes over the past 6 weeks after eating.\"  Patient states she had a CT of abdomen pelvis at [facility] 12/19/2021 she states \"I was told I had a lot of inflammation in my bowels look asleep.\"  The patient states that he has issues with constipation over the past 2 to 3 months.  She states she cannot have a bowel movement without the use of an enema.  The patient does tell me that she started Wegovy in October for weight loss.  She states soon after her symptoms started.  We did review adverse reactions of this medication which do include multiple GI symptoms.  She states that somewhere close to [facility] she began to have more constipation lasting up to 10 days at times.  She states she has tried a laxative but they cause extreme cramping.  There is a family history of colon polyps but no colon cancer.  The patient does carry a history of small bowel obstruction 2019 seen by Dr. Charlette Salvador with surgery.  He felt there appeared to be a transition point in the anterior abdominal wall underneath mesh from prior incisional hernia repair.  NG tube was inserted for decompression hydration instituted.  Patient responded well small bowel follow-through performed showed good flow no evidence of obstruction.  Bowels began moving.  Patient's diet was advanced and discharged to home.  The patient does carry history of substance abuse in the past.  The patient takes Prilosec 40 mg daily.  The patient tells me she only takes ibuprofen or any NSAIDs on occasion.  She tells me that she has tried over-the-counter MiraLAX, Ex-Lax and Dulcolax which do not work normally.  She states she has used stool softeners 2.    The patient denies any nausea, vomiting,  dysphagia,  or hematemesis.  The patient denies any fever or chills.  Denies any melena or hematochezia.  Denies any unintentional weight loss or loss of appetite.      Study Result    Narrative & Impression   EXAMINATION: CT ABDOMEN PELVIS W CONTRAST-      12/16/2020 2:15 PM CST     HISTORY: Abdominal pain, acute, nonlocalized   Summary:  1. Air-filled colon from the " cecum to the mid sigmoid region. No  obstructing mass or wall thickening is seen. Ileus suspected though  etiology is uncertain.  2. There is no mass, fluid collection, or inflammatory process.   This report was finalized on 12/16/2020 14:38 by Dr. Robinson Cheema MD.      Past Medical History:   Diagnosis Date   • Family history of colonic polyps    • GERD (gastroesophageal reflux disease)    • Small bowel obstruction (HCC)    • Substance abuse (HCC)    • Thyrotoxicosis        Past Surgical History:   Procedure Laterality Date   • ANKLE SURGERY     • EAR TUBES     • HERNIA REPAIR     • INNER EAR SURGERY     • TUBAL ABDOMINAL LIGATION           Current Outpatient Medications:   •  cholecalciferol (VITAMIN D3) 25 MCG (1000 UT) tablet, Take 3,000 Units by mouth Daily., Disp: , Rfl:   •  Ferrous Gluconate (IRON 27 PO), Take 1 capsule by mouth Daily., Disp: , Rfl:   •  loratadine (CLARITIN) 10 MG tablet, Take 10 mg by mouth As Needed., Disp: , Rfl:   •  omeprazole (priLOSEC) 40 MG capsule, Take 20 mg by mouth Daily., Disp: , Rfl:   •  Pediatric Multiple Vitamins (MULTIVITAMIN CHILDRENS PO), Take 1 capsule by mouth Daily., Disp: , Rfl:   •  Semaglutide-Weight Management (Wegovy) 1 MG/0.5ML solution auto-injector, Inject 0.5 mL under the skin into the appropriate area as directed 1 (One) Time Per Week., Disp: , Rfl:   •  ibuprofen (ADVIL,MOTRIN) 800 MG tablet, Take 1 tablet by mouth Every 8 (Eight) Hours As Needed for Mild Pain  or Moderate Pain . (Patient taking differently: Take 800 mg by mouth As Needed for Mild Pain  or Moderate Pain .), Disp: 30 tablet, Rfl: 0  •  Melatonin 5 MG capsule, Take 1 each by mouth As Needed., Disp: , Rfl:   •  sodium-potassium-magnesium sulfates (Suprep Bowel Prep Kit) 17.5-3.13-1.6 GM/177ML solution oral solution, Take 1 bottle by mouth Every 12 (Twelve) Hours. Split dose prep as directed by office instructions provided.  2 bottles = one kit., Disp: 177 mL, Rfl: 0    Allergies   Allergen  "Reactions   • Ceclor [Cefaclor] Rash     Childhood reaction-states she can take amoxicillin   • Lisinopril Cough       Social History     Socioeconomic History   • Marital status:    Tobacco Use   • Smoking status: Former Smoker     Years: 0.00     Types: Electronic Cigarette, Cigarettes   • Smokeless tobacco: Never Used   Vaping Use   • Vaping Use: Some days   • Substances: Nicotine, \"Sometimes with nicotine\"-only uses occasionally    Substance and Sexual Activity   • Alcohol use: Yes     Comment: Socially    • Drug use: No   • Sexual activity: Defer     Partners: Male     Birth control/protection: None       Family History   Problem Relation Age of Onset   • No Known Problems Mother    • Hyperlipidemia Father    • No Known Problems Sister    • No Known Problems Brother    • No Known Problems Daughter    • Colon polyps Paternal Grandmother         > 60 years of age    • Esophageal cancer Paternal Grandmother         Per pt-had precancerous cells   • Heart attack Paternal Grandfather    • No Known Problems Sister    • Liver cancer Niece         Hepatoblastoma   • Colon cancer Neg Hx    • Liver disease Neg Hx    • Rectal cancer Neg Hx    • Stomach cancer Neg Hx        Vitals:    01/05/22 1350   BP: 122/78   BP Location: Left arm   Patient Position: Sitting   Cuff Size: Adult   Pulse: 93   Temp: 97.8 °F (36.6 °C)   TempSrc: Infrared   SpO2: 98%   Weight: 94.8 kg (209 lb)   Height: 165.1 cm (65\")       Review of Systems:    General:    Present -feeling well   Skin:    Not Present-Rash   HEENT:     Not Present-Acute visual changes or Acute hearing changes   Neck :    Not Present- swollen glands   Genitourinary:      Not Present- burning, frequency, urgency hematuria, dysuria,   Cardiovascular:   Not Present-chest pain, palpitations, or pressure   Respiratory:   Not Present- shortness of breath or cough   Gastrointestinal:  Musculoskeletal:  Neurological:  Psychiatric:   Present as mentioned in the HP    Not " Present. Recent gait disturbances.    Not Present-Seizures and weakness in extremities.    Not Present- Anxiety or Depression.       Physical Exam:    General Appearance:    Alert, cooperative, in no acute distress   Psych:    Mood appropriate    Eyes:          conjunctivae and sclerae normal, no   icterus, no pallor   ENMT:    Ears appear intact with no abnormalities noted oral mucosa moist   Neck:   No adenopathy, supple, trachea midline, no thyromegaly, no   carotid bruit, no JVD    Cardiovascular:    Regular rhythm and normal rate, normal S1 and S2, no            murmur, no gallop, no rub, no click   Gastrointestinal:     Inspection normal.  Normal bowel sounds, no masses, no organomegaly, soft round non-tender, non-distended, no guarding, no rebound or tenderness. No hepatosplenomegaly.   Skin:   No bleeding, bruising or rash   Neurologic:   nonfocal       Lab Results - Last 18 Months   Lab Units 12/16/20  1245   GLUCOSE mg/dL 104*   BUN mg/dL 9   CREATININE mg/dL 0.63   SODIUM mmol/L 138   POTASSIUM mmol/L 4.0   CHLORIDE mmol/L 105   CO2 mmol/L 26.0   TOTAL PROTEIN g/dL 6.6   ALBUMIN g/dL 4.50   ALT (SGPT) U/L 11   AST (SGOT) U/L 12   ALK PHOS U/L 76   BILIRUBIN mg/dL 0.3   GLOBULIN gm/dL 2.1       Lab Results - Last 18 Months   Lab Units 12/16/20  1245   HEMOGLOBIN g/dL 12.9   HEMATOCRIT % 38.1   MCV fL 83.6   WBC 10*3/mm3 8.72   RDW % 12.9   MPV fL 9.9   PLATELETS 10*3/mm3 253         Assessment and Plan:  Assessment/Plan   Diagnoses and all orders for this visit:    1. Other constipation (Primary)  -     Case Request; Standing  -     Case Request    2. Family history of polyps in the colon  -     Case Request; Standing  -     Case Request    3. Epigastric pain  -     Case Request; Standing  -     Case Request    4. Gastroesophageal reflux disease, unspecified whether esophagitis present  -     Case Request; Standing  -     Case Request    5. Heartburn    Other orders  -     Follow Anesthesia Guidelines /  Protocol; Future  -     Obtain Informed Consent; Future  -     sodium-potassium-magnesium sulfates (Suprep Bowel Prep Kit) 17.5-3.13-1.6 GM/177ML solution oral solution; Take 1 bottle by mouth Every 12 (Twelve) Hours. Split dose prep as directed by office instructions provided.  2 bottles = one kit.  Dispense: 177 mL; Refill: 0      Will schedule patient for both EGD and colonoscopy.  The patient tells me she plans on stopping the weight loss medication to see if this could be the cause or in part of the problem.  Continue on PPI at this time.  I have requested records from [facility] ER that includes labs and imaging.       There are no Patient Instructions on file for this visit.    Next follow-up appointment      The risks, benefits, and alternatives of endoscopy were reviewed with the patient today.  Risks including perforation, with or without dilation, possibly requiring surgery.  Additional risks include risk of bleeding.  There is also the risk of a drug reaction or problems with anesthesia.  This will be discussed with the further by the anesthesia team on the day of the procedure. The benefits include the diagnosis and management of disease of the upper digestive tract.  Alternatives to endoscopy include upper GI series, laboratory testing, radiographic evaluation, or no intervention.  The patient verbalizes understanding and agrees.      The risks, benefits, and alternatives of colonoscopy were reviewed with the patient today.  Risks including perforation of the colon possibly requiring surgery or colostomy.  Additional risks include risk of bleeding from biopsies or removal of colon tissue.  There is also the risk of a drug reaction or problems with anesthesia.  This will be discussed with the further by the anesthesia team on the day of the procedure.  Lastly there is a possibility of missing a colon polyp or cancer.  The benefits include the diagnosis and management of disease of the  colon and rectum.  Alternatives to colonoscopy include barium enema, laboratory testing, radiographic evaluation, or no intervention.  The patient verbalizes understanding and agrees.      EMR Dragon/Transcription disclaimer:  Much of this encounter note is an electronic transcription/translation of spoken language to printed text. The electronic translation of spoken language may permit erroneous, or at times, nonsensical words or phrases to be inadvertently transcribed; although I have reviewed the note for such errors, some may still exist.

## 2022-01-06 PROBLEM — K59.09 OTHER CONSTIPATION: Status: ACTIVE | Noted: 2022-01-06

## 2022-01-06 PROBLEM — R10.13 EPIGASTRIC PAIN: Status: ACTIVE | Noted: 2022-01-06

## 2022-01-06 PROBLEM — Z83.71 FAMILY HISTORY OF POLYPS IN THE COLON: Status: ACTIVE | Noted: 2022-01-06

## 2022-01-06 PROBLEM — Z83.719 FAMILY HISTORY OF POLYPS IN THE COLON: Status: ACTIVE | Noted: 2022-01-06

## 2022-02-18 ENCOUNTER — LAB (OUTPATIENT)
Dept: LAB | Facility: HOSPITAL | Age: 37
End: 2022-02-18

## 2022-02-18 ENCOUNTER — TELEPHONE (OUTPATIENT)
Dept: GASTROENTEROLOGY | Facility: CLINIC | Age: 37
End: 2022-02-18

## 2022-02-18 LAB — SARS-COV-2 ORF1AB RESP QL NAA+PROBE: DETECTED

## 2022-02-18 PROCEDURE — U0004 COV-19 TEST NON-CDC HGH THRU: HCPCS | Performed by: NURSE PRACTITIONER

## 2022-02-18 PROCEDURE — C9803 HOPD COVID-19 SPEC COLLECT: HCPCS | Performed by: NURSE PRACTITIONER

## 2022-02-18 NOTE — TELEPHONE ENCOUNTER
Sheila from Covid scheduling called about this pt-she has been unable to reach pt or her  to schedule Covid test. Pt is on for endo/colon on Monday and needs tested today. I tried to call her to discuss/see if she's being tested elsewhere and was unable to reach her-I left  asking her to call us back ASAP since her procedure is Monday.

## 2022-02-19 ENCOUNTER — NURSE TRIAGE (OUTPATIENT)
Dept: CALL CENTER | Facility: HOSPITAL | Age: 37
End: 2022-02-19

## 2022-02-19 NOTE — TELEPHONE ENCOUNTER
Reviewed guideline with caller, advises home care. Caller agrees to follow care advice. States she was to have a colonoscopy on Monday, Called NS and let her know pt is COVID pos. And will not be having procedure on Monday.     Reason for Disposition  • [1] COVID-19 diagnosed by positive lab test (e.g., PCR, rapid self-test kit) AND [2] mild symptoms (e.g., cough, fever, others) AND [3] no complications or SOB    Additional Information  • Negative: SEVERE difficulty breathing (e.g., struggling for each breath, speaks in single words)  • Negative: Difficult to awaken or acting confused (e.g., disoriented, slurred speech)  • Negative: Bluish (or gray) lips or face now  • Negative: Shock suspected (e.g., cold/pale/clammy skin, too weak to stand, low BP, rapid pulse)  • Negative: Sounds like a life-threatening emergency to the triager  • Negative: [1] Diagnosed or suspected COVID-19 AND [2] symptoms lasting 3 or more weeks  • Negative: [1] COVID-19 exposure AND [2] no symptoms  • Negative: COVID-19 vaccine reaction suspected (e.g., fever, headache, muscle aches) occurring 1 to 3 days after getting vaccine  • Negative: COVID-19 vaccine, questions about  • Negative: [1] Lives with someone known to have influenza (flu test positive) AND [2] flu-like symptoms (e.g., cough, runny nose, sore throat, SOB; with or without fever)  • Negative: [1] Adult with possible COVID-19 symptoms AND [2] triager concerned about severity of symptoms or other causes  • Negative: COVID-19 and breastfeeding, questions about  • Negative: SEVERE or constant chest pain or pressure  (Exception: Mild central chest pain, present only when coughing.)  • Negative: MODERATE difficulty breathing (e.g., speaks in phrases, SOB even at rest, pulse 100-120)  • Negative: [1] Headache AND [2] stiff neck (can't touch chin to chest)  • Negative: Oxygen level (e.g., pulse oximetry) 90 percent or lower  • Negative: Chest pain or pressure  • Negative: Patient sounds  "very sick or weak to the triager  • Negative: MILD difficulty breathing (e.g., minimal/no SOB at rest, SOB with walking, pulse <100)  • Negative: Fever > 103 F (39.4 C)  • Negative: [1] Fever > 101 F (38.3 C) AND [2] age > 60 years  • Negative: [1] Fever > 100.0 F (37.8 C) AND [2] bedridden (e.g., nursing home patient, CVA, chronic illness, recovering from surgery)  • Negative: HIGH RISK for severe COVID complications (e.g., weak immune system, age > 64 years, obesity with BMI > 25, pregnant, chronic lung disease or other chronic medical condition)  (Exception: Already seen by PCP and no new or worsening symptoms.)  • Negative: [1] HIGH RISK patient AND [2] influenza is widespread in the community AND [3] ONE OR MORE respiratory symptoms: cough, sore throat, runny or stuffy nose  • Negative: [1] HIGH RISK patient AND [2] influenza exposure within the last 7 days AND [3] ONE OR MORE respiratory symptoms: cough, sore throat, runny or stuffy nose  • Negative: Oxygen level (e.g., pulse oximetry) 91 to 94 percent  • Negative: Fever present > 3 days (72 hours)  • Negative: [1] Fever returns after gone for over 24 hours AND [2] symptoms worse or not improved  • Negative: [1] Continuous (nonstop) coughing interferes with work or school AND [2] no improvement using cough treatment per Care Advice  • Negative: [1] COVID-19 infection suspected by caller or triager AND [2] mild symptoms (cough, fever, or others) AND [3] negative COVID-19 rapid test  • Negative: Cough present > 3 weeks  • Negative: [1] COVID-19 diagnosed by positive lab test (e.g., PCR, rapid self-test kit) AND [2] NO symptoms (e.g., cough, fever, others)    Answer Assessment - Initial Assessment Questions  1. COVID-19 DIAGNOSIS: \"Who made your COVID-19 diagnosis?\" \"Was it confirmed by a positive lab test or self-test?\" If not diagnosed by a doctor (or NP/PA), ask \"Are there lots of cases (community spread) where you live?\" Note: See public health department " "website, if unsure.      confirmed  2. COVID-19 EXPOSURE: \"Was there any known exposure to COVID before the symptoms began?\" CDC Definition of close contact: within 6 feet (2 meters) for a total of 15 minutes or more over a 24-hour period.       [place]  3. ONSET: \"When did the COVID-19 symptoms start?\"       A week ago Thursday  4. WORST SYMPTOM: \"What is your worst symptom?\" (e.g., cough, fever, shortness of breath, muscle aches)      Congested with a HA  5. COUGH: \"Do you have a cough?\" If Yes, ask: \"How bad is the cough?\"       no  6. FEVER: \"Do you have a fever?\" If Yes, ask: \"What is your temperature, how was it measured, and when did it start?\"      no  7. RESPIRATORY STATUS: \"Describe your breathing?\" (e.g., shortness of breath, wheezing, unable to speak)       Breathing ok  8. BETTER-SAME-WORSE: \"Are you getting better, staying the same or getting worse compared to yesterday?\"  If getting worse, ask, \"In what way?\"      same  9. HIGH RISK DISEASE: \"Do you have any chronic medical problems?\" (e.g., asthma, heart or lung disease, weak immune system, obesity, etc.)      GERD  10. VACCINE: \"Have you had the COVID-19 vaccine?\" If Yes, ask: \"Which one, how many shots, when did you get it?\"        Yes, Moderna 3 shots  11. BOOSTER: \"Have you received your COVID-19 booster?\" If Yes, ask: \"Which one and when did you get it?\"        yes  12. PREGNANCY: \"Is there any chance you are pregnant?\" \"When was your last menstrual period?\"        no  13. OTHER SYMPTOMS: \"Do you have any other symptoms?\"  (e.g., chills, fatigue, headache, loss of smell or taste, muscle pain, sore throat)        Headache  14. O2 SATURATION MONITOR:  \"Do you use an oxygen saturation monitor (pulse oximeter) at home?\" If Yes, ask \"What is your reading (oxygen level) today?\" \"What is your usual oxygen saturation reading?\" (e.g., 95%)        Yes, 96%    Protocols used: CORONAVIRUS (COVID-19) DIAGNOSED OR SUSPECTED-ADULT-AH      "

## 2023-08-11 NOTE — PROGRESS NOTES
Charlette Hazel MD Progress Note     LOS: 1 day   Patient Care Team:  Dave Lu MD as PCP - General  Dave Lu MD as PCP - Family Medicine        Subjective     No flatus or bowel movement.    Objective     Vital Signs  Temp:  [97.5 °F (36.4 °C)-98.6 °F (37 °C)] 98 °F (36.7 °C)  Heart Rate:  [70-79] 79  Resp:  [16] 16  BP: (127-145)/(77-94) 135/87    Intake & Output (last 3 days)       04/13 0701 - 04/14 0700 04/14 0701 - 04/15 0700 04/15 0701 - 04/16 0700 04/16 0701 - 04/17 0700    I.V. (mL/kg)   1955 (20.6)     IV Piggyback  1000      Total Intake(mL/kg)  1000 (10.5) 1955 (20.6)     Urine (mL/kg/hr)  350 1100 (0.5)     Emesis/NG output  600 2200 300    Total Output  950 3300 300    Net  +50 -1345 -300            Urine Unmeasured Occurrence   3 x           Physical Exam:     General Appearance:    Alert, cooperative, in no acute distress   Lungs:     respirations regular, even and unlabored    Heart:    Regular rhythm and normal rate, normal S1 and S2, no            murmur, no gallop, no rub   Chest Wall:    No abnormalities observed   Abdomen:      Slightly less distended but still tympanitic mild tenderness diffusely especially periumbilically   Extremities: No edema,    Results Review:     I reviewed the patient's new clinical results.    Lab Results (last 72 hours)     Procedure Component Value Units Date/Time    Basic Metabolic Panel [[id number]]  (Abnormal) Collected:  04/16/19 0536    Specimen:  Blood Updated:  04/16/19 0634     Glucose 85 mg/dL      BUN 8 mg/dL      Creatinine 0.66 mg/dL      Sodium 137 mmol/L      Potassium 3.6 mmol/L      Chloride 100 mmol/L      CO2 30.0 mmol/L      Calcium 8.2 mg/dL      eGFR Non African Amer 103 mL/min/1.73      BUN/Creatinine Ratio 12.1     Anion Gap 7.0 mmol/L     Narrative:       GFR Normal >60  Chronic Kidney Disease <60  Kidney Failure <15    CBC & Differential [[id number]] Collected:  04/16/19 0536    Specimen:  Blood Updated:  04/16/19 0626    Narrative:        The following orders were created for panel order CBC & Differential.  Procedure                               Abnormality         Status                     ---------                               -----------         ------                     CBC Auto Differential[[id number]]        Normal              Final result                 Please view results for these tests on the individual orders.    CBC Auto Differential [[id number]]  (Normal) Collected:  04/16/19 0536    Specimen:  Blood Updated:  04/16/19 0626     WBC 8.42 10*3/mm3      RBC 4.45 10*6/mm3      Hemoglobin 14.0 g/dL      Hematocrit 41.7 %      MCV 93.7 fL      MCH 31.5 pg      MCHC 33.6 g/dL      RDW 13.0 %      RDW-SD 44.7 fl      MPV 10.6 fL      Platelets 179 10*3/mm3      Neutrophil % 74.0 %      Lymphocyte % 16.5 %      Monocyte % 6.8 %      Eosinophil % 1.9 %      Basophil % 0.4 %      Immature Grans % 0.4 %      Neutrophils, Absolute 6.24 10*3/mm3      Lymphocytes, Absolute 1.39 10*3/mm3      Monocytes, Absolute 0.57 10*3/mm3      Eosinophils, Absolute 0.16 10*3/mm3      Basophils, Absolute 0.03 10*3/mm3      Immature Grans, Absolute 0.03 10*3/mm3      nRBC 0.0 /100 WBC     CBC (No Diff) [[id number]]  (Abnormal) Collected:  04/15/19 0431    Specimen:  Blood Updated:  04/15/19 0439     WBC 21.06 10*3/mm3      RBC 5.00 10*6/mm3      Hemoglobin 15.6 g/dL      Hematocrit 45.9 %      MCV 91.8 fL      MCH 31.2 pg      MCHC 34.0 g/dL      RDW 13.2 %      RDW-SD 44.5 fl      MPV 10.0 fL      Platelets 235 10*3/mm3     Urinalysis With Culture If Indicated - Urine, Clean Catch [[id number]]  (Abnormal) Collected:  04/15/19 0033    Specimen:  Urine, Clean Catch Updated:  04/15/19 0041     Color, UA Yellow     Appearance, UA Clear     pH, UA 6.0     Specific Gravity, UA >1.030     Glucose, UA Negative     Ketones, UA Negative     Bilirubin, UA Negative     Blood, UA Negative     Protein, UA Negative     Leuk Esterase, UA Negative     Nitrite, UA Negative      Urobilinogen, UA 0.2 E.U./dL    Narrative:       Urine microscopic not indicated.    Comprehensive Metabolic Panel [[id number]]  (Abnormal) Collected:  04/14/19 2226    Specimen:  Blood Updated:  04/14/19 2245     Glucose 134 mg/dL      BUN 13 mg/dL      Creatinine 0.68 mg/dL      Sodium 138 mmol/L      Potassium 4.0 mmol/L      Chloride 105 mmol/L      CO2 24.0 mmol/L      Calcium 9.6 mg/dL      Total Protein 7.3 g/dL      Albumin 4.50 g/dL      ALT (SGPT) 27 U/L      AST (SGOT) 20 U/L      Alkaline Phosphatase 85 U/L      Total Bilirubin 0.7 mg/dL      eGFR Non African Amer 99 mL/min/1.73      Globulin 2.8 gm/dL      A/G Ratio 1.6 g/dL      BUN/Creatinine Ratio 19.1     Anion Gap 9.0 mmol/L     Narrative:       GFR Normal >60  Chronic Kidney Disease <60  Kidney Failure <15    Lipase [[id number]]  (Normal) Collected:  04/14/19 2226    Specimen:  Blood Updated:  04/14/19 2245     Lipase 49 U/L     Amylase [[id number]]  (Normal) Collected:  04/14/19 2226    Specimen:  Blood Updated:  04/14/19 2245     Amylase 63 U/L     hCG, Serum, Qualitative [[id number]]  (Normal) Collected:  04/14/19 2226    Specimen:  Blood Updated:  04/14/19 2243     HCG Qualitative Negative    CBC & Differential [[id number]] Collected:  04/14/19 2226    Specimen:  Blood Updated:  04/14/19 2234    Narrative:       The following orders were created for panel order CBC & Differential.  Procedure                               Abnormality         Status                     ---------                               -----------         ------                     CBC Auto Differential[[id number]]        Abnormal            Final result                 Please view results for these tests on the individual orders.    CBC Auto Differential [[id number]]  (Abnormal) Collected:  04/14/19 2226    Specimen:  Blood Updated:  04/14/19 2234     WBC 15.64 10*3/mm3      RBC 5.20 10*6/mm3      Hemoglobin 16.3 g/dL      Hematocrit 46.1 %      MCV 88.7 fL      MCH 31.3 pg       MCHC 35.4 g/dL      RDW 13.1 %      RDW-SD 42.5 fl      MPV 10.3 fL      Platelets 305 10*3/mm3      Neutrophil % 87.9 %      Lymphocyte % 7.5 %      Monocyte % 3.6 %      Eosinophil % 0.4 %      Basophil % 0.2 %      Immature Grans % 0.4 %      Neutrophils, Absolute 13.74 10*3/mm3      Lymphocytes, Absolute 1.17 10*3/mm3      Monocytes, Absolute 0.56 10*3/mm3      Eosinophils, Absolute 0.07 10*3/mm3      Basophils, Absolute 0.03 10*3/mm3      Immature Grans, Absolute 0.07 10*3/mm3      nRBC 0.0 /100 WBC         Imaging Results (last 72 hours)     Procedure Component Value Units Date/Time    XR Abdomen Flat & Upright [[id number]] Collected:  04/16/19 0758     Updated:  04/16/19 0802    Narrative:       EXAMINATION:   XR ABDOMEN FLAT AND UPRIGHT-  4/16/2019 7:58 AM CDT     HISTORY: XR ABDOMEN FLAT AND UPRIGHT- 4/16/2019 7:45 AM CDT     HISTORY: Small bowel obstruction; K56.609-Unspecified intestinal  obstruction, unspecified as to partial versus complete obstruction       COMPARISON: None     FINDINGS:  There is a nonspecific bowel gas pattern. No soft tissue mass or  pathologic calcification  is visualized.     No acute skeletal abnormality is identified.        Impression:       1. Non specific bowel gas pattern..         This report was finalized on 04/16/2019 07:59 by Dr. Maxim Kelly MD.    CT Abdomen Pelvis With Contrast [[id number]] Collected:  04/15/19 0759     Updated:  04/15/19 0812    Narrative:          History:  33-year-old with right upper quadrant abdominal pain. Probably biliary  colic.     Reference:  CT abdomen pelvis April 2016     Technique  Contrast-enhanced CT abdomen/pelvis was performed with coronal and  sagittal reformatted images provided.     For this CT exam, one or more of the following dose reduction techniques  was employed:  -automated exposure control  -mA and/or kVp adjustment for patient size  -iterative reconstruction      mGy-cm     Findings     Chest  Incidental scanning  through the lower chest demonstrates clear lung  bases.     Abdomen  Liver and spleen unremarkable. Gallbladder is unremarkable. Pancreas and  adrenal glands are unremarkable.     2 small left renal cysts measuring 11 mm each. Kidneys otherwise normal  without obstructive uropathy. Normal caliber abdominal aorta.     No free air, free fluid, or lymphadenopathy.     There are dilated proximal/mid small bowel segments. Distal ileum in the  right lower quadrant is decompressed. Transition point appears to be a  small bowel segment adhered to the ventral mesh from previous hernia  repair. This may be serving as the point of obstruction. There is mild  edema adjacent to the dilated small bowel segments which can be measured  up to 3.2 cm diameter.     Much of the colon is contracted limiting its evaluation.     Pelvis  Nondistended urinary bladder. Uterus is unremarkable. There is a small  left ovarian follicle measuring around 2.3 cm. There is a small amount  of free fluid in the pelvis. Normal appendix.     No fractures or bone lesions.          Impression:       High-grade small bowel obstruction secondary to a segment of small bowel  that is adhered/tethered to the infraumbilical ventral mesh material.  Small amount of free fluid in the pelvis.     A preliminary report was provided by [employer]. No discrepancy.  This report was finalized on 04/15/2019 08:08 by Dr Jay Jay Begum, .    XR Chest 1 View [[id number]] Collected:  04/15/19 0643     Updated:  04/15/19 0647    Narrative:       EXAMINATION:   XR CHEST 1 VW-  4/15/2019 6:43 AM CDT     HISTORY: Right upper quadrant pain     Frontal upright radiograph of the chest 4/14/2019 10:50 PM CDT     COMPARISON: None.     FINDINGS:   The lungs are clear. The cardiomediastinal silhouette and pulmonary  vascularity are within normal limits.      The osseous structures and surrounding soft tissues demonstrate no acute  abnormality.       Impression:       1. No radiographic evidence  [Negative] : Heme/Lymph of acute cardiopulmonary process.        This report was finalized on 04/15/2019 06:44 by Dr. Maxim Kelly MD.              Assessment/Plan       SBO (small bowel obstruction) (CMS/HCC)      We will proceed with small bowel follow-through.      Charlette Hazel MD  04/16/19  8:30 AM

## 2024-10-31 ENCOUNTER — HOSPITAL ENCOUNTER (EMERGENCY)
Facility: HOSPITAL | Age: 39
Discharge: OTHER INSTITUTION | End: 2024-11-01
Payer: COMMERCIAL

## 2024-10-31 ENCOUNTER — APPOINTMENT (OUTPATIENT)
Dept: CT IMAGING | Facility: HOSPITAL | Age: 39
End: 2024-10-31
Payer: COMMERCIAL

## 2024-10-31 VITALS
HEART RATE: 71 BPM | BODY MASS INDEX: 37.96 KG/M2 | HEIGHT: 66 IN | WEIGHT: 236.2 LBS | DIASTOLIC BLOOD PRESSURE: 90 MMHG | SYSTOLIC BLOOD PRESSURE: 128 MMHG | TEMPERATURE: 98.2 F | RESPIRATION RATE: 18 BRPM | OXYGEN SATURATION: 98 %

## 2024-10-31 DIAGNOSIS — R11.2 NAUSEA AND VOMITING, UNSPECIFIED VOMITING TYPE: ICD-10-CM

## 2024-10-31 DIAGNOSIS — R10.84 GENERALIZED ABDOMINAL PAIN: ICD-10-CM

## 2024-10-31 DIAGNOSIS — K59.00 CONSTIPATION, UNSPECIFIED CONSTIPATION TYPE: Primary | ICD-10-CM

## 2024-10-31 LAB
ALBUMIN SERPL-MCNC: 4.3 G/DL (ref 3.5–5.2)
ALBUMIN/GLOB SERPL: 1.7 G/DL
ALP SERPL-CCNC: 86 U/L (ref 39–117)
ALT SERPL W P-5'-P-CCNC: 18 U/L (ref 1–33)
ANION GAP SERPL CALCULATED.3IONS-SCNC: 9 MMOL/L (ref 5–15)
AST SERPL-CCNC: 14 U/L (ref 1–32)
BASOPHILS # BLD AUTO: 0.05 10*3/MM3 (ref 0–0.2)
BASOPHILS NFR BLD AUTO: 0.3 % (ref 0–1.5)
BILIRUB SERPL-MCNC: 0.5 MG/DL (ref 0–1.2)
BILIRUB UR QL STRIP: NEGATIVE
BUN SERPL-MCNC: 7 MG/DL (ref 6–20)
BUN/CREAT SERPL: 10.4 (ref 7–25)
CALCIUM SPEC-SCNC: 8.8 MG/DL (ref 8.6–10.5)
CHLORIDE SERPL-SCNC: 103 MMOL/L (ref 98–107)
CLARITY UR: CLEAR
CO2 SERPL-SCNC: 28 MMOL/L (ref 22–29)
COLOR UR: YELLOW
CREAT SERPL-MCNC: 0.67 MG/DL (ref 0.57–1)
D-LACTATE SERPL-SCNC: 1.4 MMOL/L (ref 0.5–2)
DEPRECATED RDW RBC AUTO: 47 FL (ref 37–54)
EGFRCR SERPLBLD CKD-EPI 2021: 113.5 ML/MIN/1.73
EOSINOPHIL # BLD AUTO: 0.15 10*3/MM3 (ref 0–0.4)
EOSINOPHIL NFR BLD AUTO: 1 % (ref 0.3–6.2)
ERYTHROCYTE [DISTWIDTH] IN BLOOD BY AUTOMATED COUNT: 13.6 % (ref 12.3–15.4)
GLOBULIN UR ELPH-MCNC: 2.6 GM/DL
GLUCOSE SERPL-MCNC: 124 MG/DL (ref 65–99)
GLUCOSE UR STRIP-MCNC: NEGATIVE MG/DL
HCT VFR BLD AUTO: 46.8 % (ref 34–46.6)
HGB BLD-MCNC: 16 G/DL (ref 12–15.9)
HGB UR QL STRIP.AUTO: NEGATIVE
IMM GRANULOCYTES # BLD AUTO: 0.08 10*3/MM3 (ref 0–0.05)
IMM GRANULOCYTES NFR BLD AUTO: 0.5 % (ref 0–0.5)
INR PPP: 0.94 (ref 0.91–1.09)
KETONES UR QL STRIP: ABNORMAL
LEUKOCYTE ESTERASE UR QL STRIP.AUTO: NEGATIVE
LIPASE SERPL-CCNC: 19 U/L (ref 13–60)
LYMPHOCYTES # BLD AUTO: 0.91 10*3/MM3 (ref 0.7–3.1)
LYMPHOCYTES NFR BLD AUTO: 5.8 % (ref 19.6–45.3)
MAGNESIUM SERPL-MCNC: 1.8 MG/DL (ref 1.6–2.6)
MCH RBC QN AUTO: 32.1 PG (ref 26.6–33)
MCHC RBC AUTO-ENTMCNC: 34.2 G/DL (ref 31.5–35.7)
MCV RBC AUTO: 93.8 FL (ref 79–97)
MONOCYTES # BLD AUTO: 0.81 10*3/MM3 (ref 0.1–0.9)
MONOCYTES NFR BLD AUTO: 5.2 % (ref 5–12)
NEUTROPHILS NFR BLD AUTO: 13.7 10*3/MM3 (ref 1.7–7)
NEUTROPHILS NFR BLD AUTO: 87.2 % (ref 42.7–76)
NITRITE UR QL STRIP: NEGATIVE
NRBC BLD AUTO-RTO: 0 /100 WBC (ref 0–0.2)
PH UR STRIP.AUTO: 7.5 [PH] (ref 5–8)
PLATELET # BLD AUTO: 266 10*3/MM3 (ref 140–450)
PMV BLD AUTO: 9.7 FL (ref 6–12)
POTASSIUM SERPL-SCNC: 3.9 MMOL/L (ref 3.5–5.2)
PROCALCITONIN SERPL-MCNC: 0.05 NG/ML (ref 0–0.25)
PROT SERPL-MCNC: 6.9 G/DL (ref 6–8.5)
PROT UR QL STRIP: NEGATIVE
PROTHROMBIN TIME: 13 SECONDS (ref 11.8–14.8)
RBC # BLD AUTO: 4.99 10*6/MM3 (ref 3.77–5.28)
SODIUM SERPL-SCNC: 140 MMOL/L (ref 136–145)
SP GR UR STRIP: >1.03 (ref 1–1.03)
UROBILINOGEN UR QL STRIP: ABNORMAL
WBC NRBC COR # BLD AUTO: 15.7 10*3/MM3 (ref 3.4–10.8)

## 2024-10-31 PROCEDURE — 85025 COMPLETE CBC W/AUTO DIFF WBC: CPT | Performed by: EMERGENCY MEDICINE

## 2024-10-31 PROCEDURE — 25510000001 IOPAMIDOL 61 % SOLUTION: Performed by: EMERGENCY MEDICINE

## 2024-10-31 PROCEDURE — 80053 COMPREHEN METABOLIC PANEL: CPT | Performed by: EMERGENCY MEDICINE

## 2024-10-31 PROCEDURE — 83605 ASSAY OF LACTIC ACID: CPT | Performed by: EMERGENCY MEDICINE

## 2024-10-31 PROCEDURE — 85610 PROTHROMBIN TIME: CPT | Performed by: EMERGENCY MEDICINE

## 2024-10-31 PROCEDURE — 99285 EMERGENCY DEPT VISIT HI MDM: CPT

## 2024-10-31 PROCEDURE — 83690 ASSAY OF LIPASE: CPT | Performed by: EMERGENCY MEDICINE

## 2024-10-31 PROCEDURE — 81003 URINALYSIS AUTO W/O SCOPE: CPT | Performed by: EMERGENCY MEDICINE

## 2024-10-31 PROCEDURE — 25010000002 ONDANSETRON PER 1 MG

## 2024-10-31 PROCEDURE — 87040 BLOOD CULTURE FOR BACTERIA: CPT | Performed by: EMERGENCY MEDICINE

## 2024-10-31 PROCEDURE — 96376 TX/PRO/DX INJ SAME DRUG ADON: CPT

## 2024-10-31 PROCEDURE — 83735 ASSAY OF MAGNESIUM: CPT | Performed by: EMERGENCY MEDICINE

## 2024-10-31 PROCEDURE — 74177 CT ABD & PELVIS W/CONTRAST: CPT

## 2024-10-31 PROCEDURE — 96375 TX/PRO/DX INJ NEW DRUG ADDON: CPT

## 2024-10-31 PROCEDURE — 96374 THER/PROPH/DIAG INJ IV PUSH: CPT

## 2024-10-31 PROCEDURE — 84145 PROCALCITONIN (PCT): CPT | Performed by: EMERGENCY MEDICINE

## 2024-10-31 PROCEDURE — 36415 COLL VENOUS BLD VENIPUNCTURE: CPT

## 2024-10-31 PROCEDURE — 25010000002 ONDANSETRON PER 1 MG: Performed by: EMERGENCY MEDICINE

## 2024-10-31 RX ORDER — ONDANSETRON 2 MG/ML
4 INJECTION INTRAMUSCULAR; INTRAVENOUS ONCE
Status: COMPLETED | OUTPATIENT
Start: 2024-10-31 | End: 2024-10-31

## 2024-10-31 RX ORDER — IOPAMIDOL 612 MG/ML
100 INJECTION, SOLUTION INTRAVASCULAR
Status: COMPLETED | OUTPATIENT
Start: 2024-10-31 | End: 2024-10-31

## 2024-10-31 RX ORDER — SODIUM CHLORIDE 0.9 % (FLUSH) 0.9 %
10 SYRINGE (ML) INJECTION AS NEEDED
Status: DISCONTINUED | OUTPATIENT
Start: 2024-10-31 | End: 2024-11-01 | Stop reason: HOSPADM

## 2024-10-31 RX ADMIN — ONDANSETRON 4 MG: 2 INJECTION INTRAMUSCULAR; INTRAVENOUS at 19:41

## 2024-10-31 RX ADMIN — HYDROMORPHONE HYDROCHLORIDE 1 MG: 1 INJECTION, SOLUTION INTRAMUSCULAR; INTRAVENOUS; SUBCUTANEOUS at 19:40

## 2024-10-31 RX ADMIN — ONDANSETRON 4 MG: 2 INJECTION INTRAMUSCULAR; INTRAVENOUS at 23:31

## 2024-10-31 RX ADMIN — IOPAMIDOL 100 ML: 612 INJECTION, SOLUTION INTRAVENOUS at 19:57

## 2024-10-31 NOTE — ED PROVIDER NOTES
Subjective   History of Present Illness  Patient is a 39-year-old female that presents to the emergency department for complaints of abdominal pain and vomiting.  Patient reports she had an abdominal surgery but is unsure what the abdominal surgery was.  States she believes it was regarding left ovarian cyst removal with Dr. Rasmussen at [place] on Tuesday.  Patient reports she has not had a bowel movement since Sunday and woke up this morning with generalized abdominal pain and became worried about bowel obstruction.  Patient reports she has a history of small bowel obstruction and gave herself an enema.  Patient reports after enema abdominal pain got worse.  Patient reports abdominal pain is generalized with no specific area of pain.  Denies any radiation of pain.  Describes the pain as a constant dull pain with intermittent episodes of sharp stabbing pains.  She denies any fevers, body aches, chills, cough or congestion.  Denies any chest pain or shortness of breath.  Denies any lightheadedness, dizziness, blurred vision, headache or near syncope.  Patient reports following enema she did have bowel movement but reports it was watery with very small amount of stool.  Patient also reports severe nausea and several episodes of vomiting.  Patient reports she is not scheduled to follow-up with Dr. Rasmussen until 11/11.  Denies any hematemesis or blood in stool.        Review of Systems   Gastrointestinal:  Positive for abdominal pain, constipation, nausea and vomiting.   All other systems reviewed and are negative.      Past Medical History:   Diagnosis Date    Family history of colonic polyps     GERD (gastroesophageal reflux disease)     Small bowel obstruction     Substance abuse     Thyrotoxicosis        Allergies   Allergen Reactions    Ceclor [Cefaclor] Rash     Childhood reaction-states she can take amoxicillin       Past Surgical History:   Procedure Laterality Date    ANKLE SURGERY      EAR TUBES      HERNIA REPAIR    "   INNER EAR SURGERY      TUBAL ABDOMINAL LIGATION         Family History   Problem Relation Age of Onset    No Known Problems Mother     Hyperlipidemia Father     No Known Problems Sister     No Known Problems Brother     No Known Problems Daughter     Colon polyps Paternal Grandmother         > 60 years of age     Esophageal cancer Paternal Grandmother         Per pt-had precancerous cells    Heart attack Paternal Grandfather     No Known Problems Sister     Liver cancer Niece         Hepatoblastoma    Colon cancer Neg Hx     Liver disease Neg Hx     Rectal cancer Neg Hx     Stomach cancer Neg Hx        Social History     Socioeconomic History    Marital status:    Tobacco Use    Smoking status: Former     Types: Electronic Cigarette, Cigarettes    Smokeless tobacco: Never   Vaping Use    Vaping status: Some Days    Substances: Nicotine, \"Sometimes with nicotine\"-only uses occasionally    Substance and Sexual Activity    Alcohol use: Yes     Comment: Socially     Drug use: No    Sexual activity: Defer     Partners: Male     Birth control/protection: None           Objective   Physical Exam  Vitals and nursing note reviewed.   Constitutional:       Comments: Non-toxic appearing. In no acute distress.    HENT:      Head: Normocephalic and atraumatic.      Right Ear: External ear normal.      Left Ear: External ear normal.      Nose: Nose normal.      Mouth/Throat:      Mouth: Mucous membranes are moist.      Pharynx: Oropharynx is clear.   Eyes:      Extraocular Movements: Extraocular movements intact.      Conjunctiva/sclera: Conjunctivae normal.      Pupils: Pupils are equal, round, and reactive to light.   Cardiovascular:      Rate and Rhythm: Normal rate and regular rhythm.      Pulses: Normal pulses.      Heart sounds: Normal heart sounds.   Pulmonary:      Effort: Pulmonary effort is normal. No respiratory distress.      Breath sounds: Normal breath sounds. No wheezing.   Chest:      Chest wall: No " tenderness.   Abdominal:      General: Abdomen is protuberant. There is no distension.      Palpations: Abdomen is soft.      Tenderness: There is generalized abdominal tenderness. There is no right CVA tenderness, left CVA tenderness, guarding or rebound.   Musculoskeletal:         General: Normal range of motion.      Cervical back: Normal range of motion and neck supple.      Right lower leg: No edema.      Left lower leg: No edema.   Skin:     General: Skin is warm and dry.      Capillary Refill: Capillary refill takes less than 2 seconds.   Neurological:      General: No focal deficit present.      Mental Status: She is alert and oriented to person, place, and time. Mental status is at baseline.   Psychiatric:         Mood and Affect: Mood normal.         Behavior: Behavior normal.         Thought Content: Thought content normal.         Judgment: Judgment normal.       Labs Reviewed   COMPREHENSIVE METABOLIC PANEL - Abnormal; Notable for the following components:       Result Value    Glucose 124 (*)     All other components within normal limits    Narrative:     GFR Normal >60  Chronic Kidney Disease <60  Kidney Failure <15     URINALYSIS W/ CULTURE IF INDICATED - Abnormal; Notable for the following components:    Specific Gravity, UA >1.030 (*)     Ketones, UA 15 mg/dL (1+) (*)     All other components within normal limits    Narrative:     In absence of clinical symptoms, the presence of pyuria, bacteria, and/or nitrites on the urinalysis result does not correlate with infection.  Urine microscopic not indicated.   CBC WITH AUTO DIFFERENTIAL - Abnormal; Notable for the following components:    WBC 15.70 (*)     Hemoglobin 16.0 (*)     Hematocrit 46.8 (*)     Neutrophil % 87.2 (*)     Lymphocyte % 5.8 (*)     Neutrophils, Absolute 13.70 (*)     Immature Grans, Absolute 0.08 (*)     All other components within normal limits   PROTIME-INR - Normal   LIPASE - Normal   LACTIC ACID, PLASMA - Normal  "  PROCALCITONIN - Normal    Narrative:     As a Marker for Sepsis (Non-Neonates):    1. <0.5 ng/mL represents a low risk of severe sepsis and/or septic shock.  2. >2 ng/mL represents a high risk of severe sepsis and/or septic shock.    As a Marker for Lower Respiratory Tract Infections that require antibiotic therapy:    PCT on Admission    Antibiotic Therapy       6-12 Hrs later    >0.5                Strongly Recommended  >0.25 - <0.5        Recommended   0.1 - 0.25          Discouraged              Remeasure/reassess PCT  <0.1                Strongly Discouraged     Remeasure/reassess PCT    As 28 day mortality risk marker: \"Change in Procalcitonin Result\" (>80% or <=80%) if Day 0 (or Day 1) and Day 4 values are available. Refer to http://www.[email or web address]-calculator.com    Change in PCT <=80%  A decrease of PCT levels below or equal to 80% defines a positive change in PCT test result representing a higher risk for 28-day all-cause mortality of patients diagnosed with severe sepsis for septic shock.    Change in PCT >80%  A decrease of PCT levels of more than 80% defines a negative change in PCT result representing a lower risk for 28-day all-cause mortality of patients diagnosed with severe sepsis or septic shock.      MAGNESIUM - Normal   BLOOD CULTURE   BLOOD CULTURE   CBC AND DIFFERENTIAL    Narrative:     The following orders were created for panel order CBC & Differential.  Procedure                               Abnormality         Status                     ---------                               -----------         ------                     CBC Auto Differential[[id number]]        Abnormal            Final result                 Please view results for these tests on the individual orders.      CT Abdomen Pelvis With Contrast   Final Result   1. The patient has undergone hernia repair with an abdominal wall mesh   at the level of the umbilicus within the anterior peritoneum.   2. There are several abnormal " loops of small bowel within the lower   abdomen and upper pelvis with associated induration within the adjacent   small bowel mesentery. There is associated thickening of the wall of   some of these loops of bowel. No pneumoperitoneum. There is an oblong   suspected fluid collection within the mesentery which may represent   hemorrhagic fluid measuring Hounsfield units of 25. This does not have a   typical appearance for abscess or drainable fluid collection. This does   not have a typical appearance for a hematoma. There is a moderate amount   of free fluid within the pelvis which measures negative Hounsfield units   suggesting simple fluid.   3. Constipation with increased stool within the right and transverse   colon and proximal left colon to just below the splenic flexure. No   mechanical obstruction.   4. Stranding within the right anterolateral abdominal wall is likely   related to port placement for laparoscopic procedure.   5. No nephrolithiasis or obstructive uropathy. The patient has undergone   prior hysterectomy. Normal appearance of the urinary bladder.               This report was signed and finalized on 10/31/2024 8:38 PM by Dr. Chang Hyman MD.               Procedures           ED Course  ED Course as of 11/01/24 0146   u Oct 31, 2024   1859 Operative report was obtained from [facility].  Patient had a laparoscopic left nephrectomy lysis of adhesions by Dr. Rasmussen on 10/16/24.  Findings revealed significant adhesive disease and left ovarian mass.  Biopsy was obtained. [KF]   2051 Spoke with Dr. Jordan who assessed patient in the ED and states patient will need to be transferred to [facility] for further evaluation as operating surgeon is at that facility.  [KF]   2055 [facility] transfer center contacted at this time [KF]   2115 Spoke with Dr. Rasmussen at [facility] regarding patients presentation. He states he is ok with transfer back to their facility and will make gen surg aware.   [KF]      ED Course User Index  [KF] Varun Be, APRN                                               Medical Decision Making  Kalyn Tinsley is a 39 y.o. female who presents to the ED  for complaints of abdominal pain and vomiting.  Patient reports she had an abdominal surgery but is unsure what the abdominal surgery was.  States she believes it was regarding left ovarian cyst removal with Dr. Rasmussen at [place] on Tuesday.  Patient reports she has not had a bowel movement since Sunday and woke up this morning with generalized abdominal pain and became worried about bowel obstruction.  Patient reports she has a history of small bowel obstruction and gave herself an enema.  Patient reports after enema abdominal pain got worse.  Patient reports abdominal pain is generalized with no specific area of pain.  Denies any radiation of pain.  Describes the pain as a constant dull pain with intermittent episodes of sharp stabbing pains.  She denies any fevers, body aches, chills, cough or congestion.  Denies any chest pain or shortness of breath.  Denies any lightheadedness, dizziness, blurred vision, headache or near syncope.  Patient reports following enema she did have bowel movement but reports it was watery with very small amount of stool.  Patient also reports severe nausea and several episodes of vomiting.  Patient reports she is not scheduled to follow-up with Dr. Rasmussen until 11/11.  Denies any hematemesis or blood in stool.    Patient was non-toxic appearing on arrival. No acute distress was noted.  Vital signs stable.    Patient's presentation raises suspicion for differentials including, but not limited to, obstruction, perforation, postop complication, constipation.    Past medical history, surgical history, and medication regimen reviewed.     Previous notes, labs, imaging and more reviewed.    Medications administered,   ondansetron (ZOFRAN) injection 4 mg (has no administration in time range)  ondansetron  (ZOFRAN) injection 4 mg (4 mg Intravenous Given 10/31/24 1941)  HYDROmorphone (DILAUDID) injection 1 mg (1 mg Intravenous Given 10/31/24 1940)  iopamidol (ISOVUE-300) 61 % injection 100 mL (100 mL Intravenous Given 10/31/24 1957)     Please refer to above section of note for lab and imaging results that were reviewed and interpreted by radiology as well as attending physician.     Given findings described above, patient's presentation is likely consistent with constipation with nausea and vomiting.     Spoke with Dr. Jordan general surgery regarding patient's presentation and imaging results.  States that patient will need to be transferred to [facility] for continuity of care with surgeon to just performed patient surgery.    Spoke with Dr. Rasmussen at [facility] who also agrees of transfer back to their facility. Then spoke with hospitalist who accepted patient for direct admission to [facility]. Dr. Shamar Stanley accepting physician.     Nursing staff reports issue with getting ground transport by EMS due to weather and call volume. Discussed this with patient and gave patients multiple alternatives to get to [facility]. Patient reports she would like to go by [facility] to get there quicker. I discussed that patient will have to report to the hospital immediately when leaving this facility. Discussed she is to report straight there. Nursing staff advised to call accepting hospital to make them aware. Patient verbalizes understanding. Dr. Trejo made aware and in agreement.     I reassessed the patient and discussed the findings of the work up so far with everyone in the room. I said that the next step in treatment would be to transfer to the hospital that formed patient recent surgery for further workup and care. I also said that there is always some diagnostic uncertainty in the ER, that symptoms may change, and new things may be found after being transferred. Patient was transferred in stable  condition.    Dragon disclaimer:  Parts of this note may be an electronic transcription/translation of spoken language to printed text using the Dragon dictation system.       Problems Addressed:  Constipation, unspecified constipation type: complicated acute illness or injury  Generalized abdominal pain: complicated acute illness or injury  Nausea and vomiting, unspecified vomiting type: complicated acute illness or injury    Risk  Prescription drug management.        Final diagnoses:   Constipation, unspecified constipation type   Generalized abdominal pain   Nausea and vomiting, unspecified vomiting type       ED Disposition  ED Disposition       ED Disposition   Transfer to Another Facility     Condition   --    Comment   --               No follow-up provider specified.       Medication List      No changes were made to your prescriptions during this visit.            Varun Be, APRN  11/01/24 0146

## 2024-11-05 LAB
BACTERIA SPEC AEROBE CULT: NORMAL
BACTERIA SPEC AEROBE CULT: NORMAL